# Patient Record
Sex: FEMALE | Race: WHITE | NOT HISPANIC OR LATINO | Employment: OTHER | URBAN - METROPOLITAN AREA
[De-identification: names, ages, dates, MRNs, and addresses within clinical notes are randomized per-mention and may not be internally consistent; named-entity substitution may affect disease eponyms.]

---

## 2017-02-06 ENCOUNTER — OFFICE VISIT (OUTPATIENT)
Dept: INTERNAL MEDICINE | Facility: CLINIC | Age: 82
End: 2017-02-06
Payer: COMMERCIAL

## 2017-02-06 VITALS
WEIGHT: 136.2 LBS | SYSTOLIC BLOOD PRESSURE: 120 MMHG | TEMPERATURE: 98.4 F | BODY MASS INDEX: 22.69 KG/M2 | DIASTOLIC BLOOD PRESSURE: 78 MMHG | HEIGHT: 65 IN | OXYGEN SATURATION: 94 % | HEART RATE: 78 BPM

## 2017-02-06 DIAGNOSIS — I10 ESSENTIAL HYPERTENSION: ICD-10-CM

## 2017-02-06 DIAGNOSIS — R73.01 IMPAIRED FASTING GLUCOSE: ICD-10-CM

## 2017-02-06 DIAGNOSIS — Z85.3 PERSONAL HISTORY OF MALIGNANT NEOPLASM OF BREAST: ICD-10-CM

## 2017-02-06 DIAGNOSIS — R30.0 DYSURIA: ICD-10-CM

## 2017-02-06 DIAGNOSIS — Z00.00 MEDICARE ANNUAL WELLNESS VISIT, SUBSEQUENT: Primary | ICD-10-CM

## 2017-02-06 DIAGNOSIS — N18.2 CKD (CHRONIC KIDNEY DISEASE) STAGE 2, GFR 60-89 ML/MIN: ICD-10-CM

## 2017-02-06 DIAGNOSIS — R41.3 MEMORY CHANGES: ICD-10-CM

## 2017-02-06 PROCEDURE — 99397 PER PM REEVAL EST PAT 65+ YR: CPT | Performed by: INTERNAL MEDICINE

## 2017-02-06 PROCEDURE — 99213 OFFICE O/P EST LOW 20 MIN: CPT | Mod: 25 | Performed by: INTERNAL MEDICINE

## 2017-02-06 NOTE — PROGRESS NOTES
SUBJECTIVE:                                                            Destiny Gomez is a 81 year old female who presents for Preventive Visit.    Are you in the first 12 months of your Medicare Part B coverage?  No    Healthy Habits:    Do you get at least three servings of calcium containing foods daily (dairy, green leafy vegetables, etc.)? yes    Amount of exercise or daily activities, outside of work: 1-2 day(s) per week    Problems taking medications regularly No    Medication side effects: No    Have you had an eye exam in the past two years? yes    Do you see a dentist twice per year? yes    Do you have sleep apnea, excessive snoring or daytime drowsiness?no    COGNITIVE SCREEN  1) Repeat 3 items (Banana, Sunrise, Chair)    2) Clock draw: ABNORMAL  3) 3 item recall: Recalls 1 object   Results: ABNORMAL clock, 1-2 items recalled: COGNITIVE IMPAIRMENT POSSIBLE    Mini-CogTM Copyright S Manuel. Licensed by the author for use in [facility] [employer]; reprinted with permission ([email or web address]). All rights reserved.      Additional issues to address:  1.  Follow-up HTN.  Patient is not checking outpatient blood pressures.  She reports no side effects from BP medications.   2.  Follow-up CKD2, impaired fasting glucose   3.  Patient has noted some problems with memory.   Still pays the bills, leads a bible study, looks up things.  Reads a lot, maybe a bit of problems recalling.  Lives with  in their home.   Staying very active.    still works, owns a business.     4.  Would like B12 check.  Patient is swallowing her B12, not using under the tongue.  5.  Mild intermittent dysuria, several months or more.   Cranberry tablets and azo do help, but symptoms come back.  Mild urinary frequency, nocturia x 2.  6.  Wants ELLE saldana treated.  No symptoms.  7.  Follow-up neuropathy, some symptoms when rising.  No other paresthesias, etc.      All Histories reviewed and updated in [facility] as appropriate.  Social  "History   Substance Use Topics     Smoking status: Never Smoker      Smokeless tobacco: Never Used      Comment: smoker from ages [age]     Alcohol Use: Yes      Comment: rarely       The patient does not drink >3 drinks per day nor >7 drinks per week.    Today's PHQ-2 Score:   PHQ-2 ( 1999 Pfizer) 12/30/2015 12/15/2014   Q1: Little interest or pleasure in doing things 0 0   Q2: Feeling down, depressed or hopeless 0 0   PHQ-2 Score 0 0       Do you feel safe in your environment - Yes    Do you have a Health Care Directive?: Yes: Advance Directive has been received and scanned.    Current providers sharing in care for this patient include:   Patient Care Team:  Neri Stubbs MD as PCP - General (Internal Medicine)      Hearing impairment: No    Ability to successfully perform activities of daily living: Yes, no assistance needed     Fall risk:  Fallen 2 or more times in the past year?: No  Any fall with injury in the past year?: No    Home safety:  lack of grab bars in the bathroom      The following health maintenance items are reviewed in Epic and correct as of today:  Health Maintenance   Topic Date Due     INFLUENZA VACCINE (SYSTEM ASSIGNED)  09/01/2016     BMP Q1 YR (NO INBASKET)  12/30/2016     FALL RISK ASSESSMENT  12/30/2016     MICROALBUMIN Q1 YEAR( NO INBASKET)  12/30/2016     TETANUS IMMUNIZATION (SYSTEM ASSIGNED)  05/19/2019     LIPID SCREEN Q5 YR FEMALE (SYSTEM ASSIGNED)  12/15/2019     ADVANCE DIRECTIVE PLANNING Q5 YRS (NO INBASKET)  01/19/2021     DEXA SCAN SCREENING (SYSTEM ASSIGNED)  Completed     PNEUMOCOCCAL  Completed         ROS:  Constitutional, HEENT, cardiovascular, pulmonary, gi and gu systems are negative, except as otherwise noted.      OBJECTIVE:                                                            /78 mmHg  Pulse 78  Temp(Src) 98.4  F (36.9  C) (Oral)  Ht 5' 4.5\" (1.638 m)  Wt 136 lb 3.2 oz (61.78 kg)  BMI 23.03 kg/m2  SpO2 94% Estimated body mass index is 24.56 " "kg/(m^2) as calculated from the following:    Height as of 12/30/15: 5' 5.5\" (1.664 m).    Weight as of 12/30/15: 149 lb 14.4 oz (67.994 kg).  EXAM:   GENERAL: healthy, alert and no distress  NECK: no adenopathy, no asymmetry, masses, or scars and thyroid normal to palpation  RESP: lungs clear to auscultation - no rales, rhonchi or wheezes  CV: regular rate and rhythm, normal S1 S2, no S3 or S4, no murmur, click or rub, no peripheral edema and peripheral pulses strong  MS: no gross musculoskeletal defects noted, no edema  NEURO: Normal strength and tone, speech normal, conversationally intact, gait is good    Large SK R lateral brow area, thick, frozen.    WORLD spell correct back and forth  2 errors on serial 7's to 50's    ASSESSMENT / PLAN:                                                                ASSESSMENT:   1.  Annual Wellness Visit  2.  HTN, controlled   3.  Follow-up CKD2  4.  Mild objective memory issues, overall seems to be doing well from a functional point of view.    Will need to follow.  5.  Intermittent dysuria, frequency    PLAN:  1.  Check fasting labs soon - do urine specimen at that time.  2.  Further plans after labs done.   If negative, would use the cranberry extract daily and recheck things in a year.    3.  DXA scan        End of Life Planning:  Patient currently has an advanced directive: Yes.  Practitioner is supportive of decision.    COUNSELING:  Reviewed preventive health counseling, as reflected in patient instructions        Estimated body mass index is 24.56 kg/(m^2) as calculated from the following:    Height as of 12/30/15: 5' 5.5\" (1.664 m).    Weight as of 12/30/15: 149 lb 14.4 oz (67.994 kg).     reports that she has never smoked. She has never used smokeless tobacco.      Appropriate preventive services were discussed with this patient, including applicable screening as appropriate for cardiovascular disease, diabetes, osteopenia/osteoporosis, and glaucoma.  As appropriate " for age/gender, discussed screening for colorectal cancer, prostate cancer, breast cancer, and cervical cancer. Checklist reviewing preventive services available has been given to the patient.    Reviewed patients plan of care and provided an AVS. The Basic Care Plan (routine screening as documented in Health Maintenance) for Destiny meets the Care Plan requirement. This Care Plan has been established and reviewed with the Patient.    Counseling Resources:  ATP IV Guidelines  Pooled Cohorts Equation Calculator  Breast Cancer Risk Calculator  FRAX Risk Assessment  ICSI Preventive Guidelines  Dietary Guidelines for Americans, 2010  USDA's MyPlate  ASA Prophylaxis  Lung CA Screening    Neri Stubbs MD  [facility]

## 2017-02-06 NOTE — NURSING NOTE
"Chief Complaint   Patient presents with     Wellness Visit       Initial /78 mmHg  Pulse 78  Temp(Src) 98.4  F (36.9  C) (Oral)  Ht 5' 4.5\" (1.638 m)  Wt 136 lb 3.2 oz (61.78 kg)  BMI 23.03 kg/m2  SpO2 94% Estimated body mass index is 23.03 kg/(m^2) as calculated from the following:    Height as of this encounter: 5' 4.5\" (1.638 m).    Weight as of this encounter: 136 lb 3.2 oz (61.78 kg).  Medication Reconciliation: complete    "

## 2017-02-06 NOTE — MR AVS SNAPSHOT
After Visit Summary   2/6/2017    Destiny Gomez    MRN: [id number]           Patient Information     Date Of Birth          1935        Visit Information        Provider Department      2/6/2017 3:30 PM Neri Stubbs MD [facility]        Today's Diagnoses     Essential hypertension    -  1     Dysuria         CKD (chronic kidney disease) stage 2, GFR 60-89 ml/min         Impaired fasting glucose         Personal history of malignant neoplasm of breast         Memory changes           Care Instructions      Preventive Health Recommendations    Female Ages 65 +    Yearly exam:     See your health care provider every year in order to  o Review health changes.   o Discuss preventive care.    o Review your medicines if your doctor has prescribed any.      You no longer need a yearly Pap test unless you've had an abnormal Pap test in the past 10 years. If you have vaginal symptoms, such as bleeding or discharge, be sure to talk with your provider about a Pap test.      Every 1 to 2 years, have a mammogram.  If you are over 69, talk with your health care provider about whether or not you want to continue having screening mammograms.      Every 10 years, have a colonoscopy. Or, have a yearly FIT test (stool test). These exams will check for colon cancer.       Have a cholesterol test every 5 years, or more often if your doctor advises it.       Have a diabetes test (fasting glucose) every three years. If you are at risk for diabetes, you should have this test more often.       At age 65, have a bone density scan (DEXA) to check for osteoporosis (brittle bone disease).    Shots:    Get a flu shot each year.    Get a tetanus shot every 10 years.    Talk to your doctor about your pneumonia vaccines. There are now two you should receive - Pneumovax (PPSV 23) and Prevnar (PCV 13).    Talk to your doctor about the shingles vaccine.    Talk to your doctor about the hepatitis B  vaccine.    Nutrition:     Eat at least 5 servings of fruits and vegetables each day.      Eat whole-grain bread, whole-wheat pasta and brown rice instead of white grains and rice.      Talk to your provider about Calcium and Vitamin D.     Lifestyle    Exercise at least 150 minutes a week (30 minutes a day, 5 days a week). This will help you control your weight and prevent disease.      Limit alcohol to one drink per day.      No smoking.       Wear sunscreen to prevent skin cancer.       See your dentist twice a year for an exam and cleaning.      See your eye doctor every 1 to 2 years to screen for conditions such as glaucoma, macular degeneration and cataracts.    PLAN:  1.  Check fasting labs soon - do urine specimen at that time.  2.  Further plans after labs done.   If negative, would use the cranberry extract daily and recheck things in a year.          Follow-ups after your visit        Future tests that were ordered for you today     Open Future Orders        Priority Expected Expires Ordered    Vitamin B12 Routine 2/6/2017 2/6/2018 2/6/2017    UA with Microscopic reflex to Culture Routine  2/6/2018 2/6/2017    Hemoglobin A1c Routine 2/6/2017 2/6/2018 2/6/2017    TSH Routine 2/6/2017 2/6/2018 2/6/2017            Who to contact     If you have questions or need follow up information about today's clinic visit or your schedule please contact [facility] directly at [phone].  Normal or non-critical lab and imaging results will be communicated to you by MyChart, letter or phone within 4 business days after the clinic has received the results. If you do not hear from us within 7 days, please contact the clinic through MyChart or phone. If you have a critical or abnormal lab result, we will notify you by phone as soon as possible.  Submit refill requests through [employer] or call your pharmacy and they will forward the refill request to us. Please allow 3 business days for your refill  "to be completed.          Additional Information About Your Visit        [employer] Information     iCAD lets you send messages to your doctor, view your test results, renew your prescriptions, schedule appointments and more. To sign up, go to [email or web address] . Click on \"Log in\" on the left side of the screen, which will take you to the Welcome page. Then click on \"Sign up Now\" on the right side of the page.     You will be asked to enter the access code listed below, as well as some personal information. Please follow the directions to create your username and password.     Your access code is: [email or web address]  Expires: 2017  4:46 PM     Your access code will  in 90 days. If you need help or a new code, please call your [facility] clinic or [phone].        Care EveryWhere ID     This is your [facility] EveryWhere ID. This could be used by other organizations to access your [facility] medical records  [id number]        Your Vitals Were     Pulse Temperature Height BMI (Body Mass Index) Pulse Oximetry       78 98.4  F (36.9  C) (Oral) 5' 4.5\" (1.638 m) 23.03 kg/m2 94%        Blood Pressure from Last 3 Encounters:   17 120/78   12/30/15 118/66   12/15/14 130/80    Weight from Last 3 Encounters:   17 136 lb 3.2 oz (61.78 kg)   12/30/15 149 lb 14.4 oz (67.994 kg)   12/15/14 163 lb 4.8 oz (74.072 kg)               Primary Care Provider Office Phone # Fax #    Neri Stubbs [phone]       [facility] [address] [facility] [address]        Thank you!     Thank you for choosing [facility]  for your care. Our goal is always to provide you with excellent care. Hearing back from our patients is one way we can continue to improve our services. Please take a few minutes to complete the written survey that you may receive in the mail after your visit with us. Thank you!             Your Updated Medication List - Protect others around you: " Learn how to safely use, store and throw away your medicines at www.disposemymeds.org.          This list is accurate as of: 2/6/17  4:46 PM.  Always use your most recent med list.                   Brand Name Dispense Instructions for use    CALCIUM 600 + D 600-200 MG-UNIT Tabs     100    2 PO QD       CRANBERRY EXTRACT PO      Take  by mouth daily.       cyabnocobalamin 2500 MCG sublingual tablet    VITAMIN B-12     Place 2,500 mcg under the tongue daily       losartan 100 MG tablet    COZAAR    90 tablet    Take 1 tablet (100 mg) by mouth daily

## 2017-02-06 NOTE — PATIENT INSTRUCTIONS

## 2017-02-07 ENCOUNTER — TELEPHONE (OUTPATIENT)
Dept: INTERNAL MEDICINE | Facility: CLINIC | Age: 82
End: 2017-02-07

## 2017-02-07 NOTE — TELEPHONE ENCOUNTER
Reason for Call: Request for an order or referral:    Order or referral being requested: dexa    Date needed: as soon as possible    Has the patient been seen by the PCP for this problem? Not Applicable    Additional comments: It was hand written on the after visit summary. No orders were placed. Call her if she needs to make an appointment.    Phone number Patient can be reached at:  Other phone number:  [phone]    Best Time:  anytime     Can we leave a detailed message on this number?  YES    Call taken on 2/7/2017 at 10:42 AM by Mignon Ferrell

## 2017-02-09 PROBLEM — R41.3 MEMORY LOSS: Status: ACTIVE | Noted: 2017-02-06

## 2017-02-15 DIAGNOSIS — R73.01 IMPAIRED FASTING GLUCOSE: ICD-10-CM

## 2017-02-15 DIAGNOSIS — I10 ESSENTIAL HYPERTENSION: ICD-10-CM

## 2017-02-15 DIAGNOSIS — N18.2 CKD (CHRONIC KIDNEY DISEASE) STAGE 2, GFR 60-89 ML/MIN: ICD-10-CM

## 2017-02-15 DIAGNOSIS — R30.0 DYSURIA: Primary | ICD-10-CM

## 2017-02-15 DIAGNOSIS — R41.3 MEMORY CHANGES: ICD-10-CM

## 2017-02-15 LAB
ALBUMIN UR-MCNC: NEGATIVE MG/DL
ANION GAP SERPL CALCULATED.3IONS-SCNC: 8 MMOL/L (ref 3–14)
APPEARANCE UR: CLEAR
BACTERIA #/AREA URNS HPF: ABNORMAL /HPF
BILIRUB UR QL STRIP: NEGATIVE
BUN SERPL-MCNC: 16 MG/DL (ref 7–30)
CALCIUM SERPL-MCNC: 9.6 MG/DL (ref 8.5–10.1)
CHLORIDE SERPL-SCNC: 110 MMOL/L (ref 94–109)
CO2 SERPL-SCNC: 24 MMOL/L (ref 20–32)
COLOR UR AUTO: YELLOW
CREAT SERPL-MCNC: 0.85 MG/DL (ref 0.52–1.04)
CREAT UR-MCNC: 76 MG/DL
GFR SERPL CREATININE-BSD FRML MDRD: 65 ML/MIN/1.7M2
GLUCOSE SERPL-MCNC: 111 MG/DL (ref 70–99)
GLUCOSE UR STRIP-MCNC: NEGATIVE MG/DL
HBA1C MFR BLD: 5.4 % (ref 4.3–6)
HGB UR QL STRIP: ABNORMAL
KETONES UR STRIP-MCNC: NEGATIVE MG/DL
LEUKOCYTE ESTERASE UR QL STRIP: ABNORMAL
MICROALBUMIN UR-MCNC: 115 MG/L
MICROALBUMIN/CREAT UR: 150.72 MG/G CR (ref 0–25)
NITRATE UR QL: POSITIVE
PH UR STRIP: 6 PH (ref 5–7)
POTASSIUM SERPL-SCNC: 3.8 MMOL/L (ref 3.4–5.3)
RBC #/AREA URNS AUTO: ABNORMAL /HPF (ref 0–2)
SODIUM SERPL-SCNC: 142 MMOL/L (ref 133–144)
SP GR UR STRIP: 1.01 (ref 1–1.03)
TSH SERPL DL<=0.05 MIU/L-ACNC: 2.36 MU/L (ref 0.4–4)
URN SPEC COLLECT METH UR: ABNORMAL
UROBILINOGEN UR STRIP-ACNC: 0.2 EU/DL (ref 0.2–1)
VIT B12 SERPL-MCNC: 1165 PG/ML (ref 193–986)
WBC #/AREA URNS AUTO: ABNORMAL /HPF (ref 0–2)

## 2017-02-15 PROCEDURE — 80048 BASIC METABOLIC PNL TOTAL CA: CPT | Performed by: INTERNAL MEDICINE

## 2017-02-15 PROCEDURE — 36415 COLL VENOUS BLD VENIPUNCTURE: CPT | Performed by: INTERNAL MEDICINE

## 2017-02-15 PROCEDURE — 84443 ASSAY THYROID STIM HORMONE: CPT | Performed by: INTERNAL MEDICINE

## 2017-02-15 PROCEDURE — 82607 VITAMIN B-12: CPT | Performed by: INTERNAL MEDICINE

## 2017-02-15 PROCEDURE — 83036 HEMOGLOBIN GLYCOSYLATED A1C: CPT | Performed by: INTERNAL MEDICINE

## 2017-02-15 PROCEDURE — 81001 URINALYSIS AUTO W/SCOPE: CPT | Performed by: INTERNAL MEDICINE

## 2017-02-15 PROCEDURE — 87186 SC STD MICRODIL/AGAR DIL: CPT | Performed by: INTERNAL MEDICINE

## 2017-02-15 PROCEDURE — 82043 UR ALBUMIN QUANTITATIVE: CPT | Performed by: INTERNAL MEDICINE

## 2017-02-15 PROCEDURE — 87086 URINE CULTURE/COLONY COUNT: CPT | Performed by: INTERNAL MEDICINE

## 2017-02-15 PROCEDURE — 87088 URINE BACTERIA CULTURE: CPT | Performed by: INTERNAL MEDICINE

## 2017-02-19 LAB
BACTERIA SPEC CULT: ABNORMAL
MICRO REPORT STATUS: ABNORMAL
MICROORGANISM SPEC CULT: ABNORMAL
MICROORGANISM SPEC CULT: ABNORMAL
SPECIMEN SOURCE: ABNORMAL

## 2017-02-20 ENCOUNTER — TELEPHONE (OUTPATIENT)
Dept: INTERNAL MEDICINE | Facility: CLINIC | Age: 82
End: 2017-02-20

## 2017-02-20 DIAGNOSIS — N30.00 ACUTE CYSTITIS WITHOUT HEMATURIA: Primary | ICD-10-CM

## 2017-02-20 RX ORDER — SULFAMETHOXAZOLE/TRIMETHOPRIM 800-160 MG
1 TABLET ORAL 2 TIMES DAILY
Qty: 14 TABLET | Refills: 0 | Status: SHIPPED | OUTPATIENT
Start: 2017-02-20 | End: 2017-02-27

## 2017-02-21 NOTE — TELEPHONE ENCOUNTER
Urine is abnormal and growing out Klebsiella.  Please contact patient ASAP and advise.  Prescription for Septra sent.   Notify MD if symptoms not resolved.

## 2017-02-23 DIAGNOSIS — I10 ESSENTIAL HYPERTENSION: ICD-10-CM

## 2017-02-24 RX ORDER — LOSARTAN POTASSIUM 100 MG/1
100 TABLET ORAL DAILY
Qty: 90 TABLET | Refills: 3 | Status: SHIPPED | OUTPATIENT
Start: 2017-02-24 | End: 2018-01-29

## 2017-02-24 NOTE — TELEPHONE ENCOUNTER
losartan (COZAAR) 100 MG tablet      Last Written Prescription Date: 11/10/2016  Last Fill Quantity: 90 tablet, # refills: 0  Last Office Visit with G, P or [facility] prescribing provider: 2/06/2017       Potassium   Date Value Ref Range Status   02/15/2017 3.8 3.4 - 5.3 mmol/L Final     Creatinine   Date Value Ref Range Status   02/15/2017 0.85 0.52 - 1.04 mg/dL Final     BP Readings from Last 3 Encounters:   02/06/17 120/78   12/30/15 118/66   12/15/14 130/80

## 2018-01-29 DIAGNOSIS — I10 ESSENTIAL HYPERTENSION: ICD-10-CM

## 2018-01-30 RX ORDER — LOSARTAN POTASSIUM 100 MG/1
100 TABLET ORAL DAILY
Qty: 90 TABLET | Refills: 0 | Status: SHIPPED | OUTPATIENT
Start: 2018-01-30 | End: 2018-03-16

## 2018-03-16 ENCOUNTER — OFFICE VISIT (OUTPATIENT)
Dept: INTERNAL MEDICINE | Facility: CLINIC | Age: 83
End: 2018-03-16
Payer: MEDICARE

## 2018-03-16 VITALS
TEMPERATURE: 98.1 F | DIASTOLIC BLOOD PRESSURE: 72 MMHG | OXYGEN SATURATION: 99 % | HEART RATE: 82 BPM | BODY MASS INDEX: 22.9 KG/M2 | RESPIRATION RATE: 16 BRPM | SYSTOLIC BLOOD PRESSURE: 112 MMHG | HEIGHT: 66 IN | WEIGHT: 142.5 LBS

## 2018-03-16 DIAGNOSIS — Z00.00 MEDICARE ANNUAL WELLNESS VISIT, SUBSEQUENT: Primary | ICD-10-CM

## 2018-03-16 DIAGNOSIS — I10 ESSENTIAL HYPERTENSION: ICD-10-CM

## 2018-03-16 DIAGNOSIS — Z13.6 CARDIOVASCULAR SCREENING; LDL GOAL LESS THAN 130: ICD-10-CM

## 2018-03-16 DIAGNOSIS — Z91.81 AT RISK FOR FALLING: ICD-10-CM

## 2018-03-16 DIAGNOSIS — R41.3 MEMORY LOSS: ICD-10-CM

## 2018-03-16 DIAGNOSIS — N39.0 URINARY TRACT INFECTION WITHOUT HEMATURIA, SITE UNSPECIFIED: ICD-10-CM

## 2018-03-16 DIAGNOSIS — R73.01 IMPAIRED FASTING GLUCOSE: ICD-10-CM

## 2018-03-16 DIAGNOSIS — N18.2 CKD (CHRONIC KIDNEY DISEASE) STAGE 2, GFR 60-89 ML/MIN: ICD-10-CM

## 2018-03-16 LAB
ANION GAP SERPL CALCULATED.3IONS-SCNC: 3 MMOL/L (ref 3–14)
BUN SERPL-MCNC: 23 MG/DL (ref 7–30)
CALCIUM SERPL-MCNC: 10 MG/DL (ref 8.5–10.1)
CHLORIDE SERPL-SCNC: 108 MMOL/L (ref 94–109)
CHOLEST SERPL-MCNC: 214 MG/DL
CO2 SERPL-SCNC: 30 MMOL/L (ref 20–32)
CREAT SERPL-MCNC: 0.93 MG/DL (ref 0.52–1.04)
CREAT UR-MCNC: 114 MG/DL
GFR SERPL CREATININE-BSD FRML MDRD: 58 ML/MIN/1.7M2
GLUCOSE SERPL-MCNC: 115 MG/DL (ref 70–99)
HDLC SERPL-MCNC: 59 MG/DL
HGB BLD-MCNC: 15.1 G/DL (ref 11.7–15.7)
LDLC SERPL CALC-MCNC: 139 MG/DL
MICROALBUMIN UR-MCNC: 148 MG/L
MICROALBUMIN/CREAT UR: 129.82 MG/G CR (ref 0–25)
NONHDLC SERPL-MCNC: 155 MG/DL
POTASSIUM SERPL-SCNC: 4.4 MMOL/L (ref 3.4–5.3)
SODIUM SERPL-SCNC: 141 MMOL/L (ref 133–144)
TRIGL SERPL-MCNC: 78 MG/DL

## 2018-03-16 PROCEDURE — 99214 OFFICE O/P EST MOD 30 MIN: CPT | Mod: 25 | Performed by: INTERNAL MEDICINE

## 2018-03-16 PROCEDURE — 85018 HEMOGLOBIN: CPT | Performed by: INTERNAL MEDICINE

## 2018-03-16 PROCEDURE — 80061 LIPID PANEL: CPT | Performed by: INTERNAL MEDICINE

## 2018-03-16 PROCEDURE — 80048 BASIC METABOLIC PNL TOTAL CA: CPT | Performed by: INTERNAL MEDICINE

## 2018-03-16 PROCEDURE — 36415 COLL VENOUS BLD VENIPUNCTURE: CPT | Performed by: INTERNAL MEDICINE

## 2018-03-16 PROCEDURE — 99397 PER PM REEVAL EST PAT 65+ YR: CPT | Performed by: INTERNAL MEDICINE

## 2018-03-16 PROCEDURE — 82043 UR ALBUMIN QUANTITATIVE: CPT | Performed by: INTERNAL MEDICINE

## 2018-03-16 RX ORDER — MULTIPLE VITAMINS W/ MINERALS TAB 9MG-400MCG
1 TAB ORAL DAILY
COMMUNITY
End: 2019-04-26

## 2018-03-16 RX ORDER — LOSARTAN POTASSIUM 100 MG/1
100 TABLET ORAL DAILY
Qty: 90 TABLET | Refills: 0 | Status: SHIPPED | OUTPATIENT
Start: 2018-03-16 | End: 2018-07-18

## 2018-03-16 NOTE — PROGRESS NOTES
SUBJECTIVE:   Destiny Gomez is a 82 year old female who presents for Preventive Visit.   Here with daughter, Caryn.    Are you in the first 12 months of your Medicare Part B coverage?  No    Healthy Habits:    Do you get at least three servings of calcium containing foods daily (dairy, green leafy vegetables, etc.)? yes    Amount of exercise or daily activities, outside of work: none    Problems taking medications regularly No    Medication side effects: diminished taste?     Have you had an eye exam in the past two years? yes    Do you see a dentist twice per year? yes    Do you have sleep apnea, excessive snoring or daytime drowsiness?no      Ability to successfully perform activities of daily living: Yes, no assistance needed    Home safety:  none identified     Hearing impairment: No    Fall risk:  Fallen 2 or more times in the past year?: No  Any fall with injury in the past year?: No      COGNITIVE SCREEN  1) Repeat 3 items (Banana, Sunrise, Chair)    2) Clock draw: NORMAL  3) 3 item recall: Recalls NO objects   Results: 0 items recalled: PROBABLE COGNITIVE IMPAIRMENT    Mini-CogTM Copyright MEGAN Jackson. Licensed by the author for use in [facility] [employer]; reprinted with permission ([email or web address]). All rights reserved.      Additional issues to address:  1.  Follow-up memory loss, short term memory.  Still drives, hasn't been lost.   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    Still pays the bills, goes to [employer] study twice weekly, looks up things.  Reads a lot, mostly newspaper - not books any more.  Lives with  in their home.   Staying very active.    still works, owns a business.     - patient uses a calendar, still able to recall some appointments.   Double and triple checks many things with family.   - daughter notes that she is less bold about her sphere than in the past  2.  Follow-up HTN.  Patient is not checking outpatient blood pressures.  She  reports no side effects from BP medications.   Denies lightheadedness.    3.  Follow-up CKD2, impaired fasting glucose   4.  Follow-up UTI last office visit a year ago.   Patient denies dysuria, frequency.      Reviewed and updated as needed this visit by clinical staff  Tobacco  Allergies  Meds         Reviewed and updated as needed this visit by Provider        Social History   Substance Use Topics     Smoking status: Never Smoker     Smokeless tobacco: Never Used      Comment: smoker from ages [age]     Alcohol use Yes      Comment: rarely       If you drink alcohol do you typically have >3 drinks per day or >7 drinks per week? No                        Today's PHQ-2 Score:   PHQ-2 ( 1999 Pfizer) 2/6/2017 12/30/2015   Q1: Little interest or pleasure in doing things 0 0   Q2: Feeling down, depressed or hopeless 0 0   PHQ-2 Score 0 0       Do you feel safe in your environment - Yes    Do you have a Health Care Directive?: Yes: Advance Directive has been received and scanned.    Current providers sharing in care for this patient include:   Patient Care Team:  Neri Stubbs MD as PCP - General (Internal Medicine)    The following health maintenance items are reviewed in Epic and correct as of today:  Health Maintenance   Topic Date Due     FALL RISK ASSESSMENT  02/06/2018     BMP Q1 YR  02/15/2018     MICROALBUMIN Q1 YEAR  02/15/2018     INFLUENZA VACCINE (SYSTEM ASSIGNED)  09/01/2018     TETANUS IMMUNIZATION (SYSTEM ASSIGNED)  05/19/2019     ADVANCE DIRECTIVE PLANNING Q5 YRS  01/19/2021     DEXA SCAN SCREENING (SYSTEM ASSIGNED)  Completed     PNEUMOCOCCAL  Completed       ROS:  CONSTITUTIONAL: NEGATIVE for fever, chills, change in weight  INTEGUMENTARY/SKIN: NEGATIVE for worrisome rashes, moles or lesions  EYES: NEGATIVE for vision changes or irritation  ENT/MOUTH: gets sinus pressure each winter, usually better by spring.   Warm washcloth helps.   Otherwise NEGATIVE for ear, mouth and throat problems.  Some  "secondary headaches.  RESP: NEGATIVE for significant cough or SOB  BREAST: NEGATIVE for masses, tenderness or discharge  CV: NEGATIVE for chest pain, palpitations or peripheral edema  GI: NEGATIVE for nausea, abdominal pain, heartburn, or change in bowel habits  : NEGATIVE for frequency, dysuria, or hematuria  MUSCULOSKELETAL: NEGATIVE for significant arthralgias or myalgia  NEURO: NEGATIVE for weakness, dizziness or paresthesias  ENDOCRINE: NEGATIVE for temperature intolerance, skin/hair changes  HEME: NEGATIVE for bleeding problems  PSYCHIATRIC: NEGATIVE for changes in mood or affect    OBJECTIVE:   /72  Pulse 82  Temp 98.1  F (36.7  C) (Oral)  Resp 16  Ht 5' 5.75\" (1.67 m)  Wt 142 lb 8 oz (64.6 kg)  SpO2 99%  BMI 23.18 kg/m2 Estimated body mass index is 23.02 kg/(m^2) as calculated from the following:    Height as of 2/6/17: 5' 4.5\" (1.638 m).    Weight as of 2/6/17: 136 lb 3.2 oz (61.8 kg).  EXAM:   Reg heart tones  No carotid bruits]  Speech and conversation seems fully normal  Walks a bit bent over - looks at the ground, but straightens up   Normal Romberg, gait fairly normal  WORLD spell incorrect, backward and forward.  Cannot recall recent events, at all    ASSESSMENT / PLAN:     ASSESSMENT:   1.  Annual Wellness Visit  2.  Short term memory loss/dementia.  Rule out vascular, alzheimer's type.   No previous brain imaging, will proceed.  Discussed in full.    3.  HTN, controlled   4.  Recheck after UTI      PLAN:  1.  Continue present medications   2.  Consider trying nasal steroid for sinus pressure:   flonase 2 sprays in each nostril daily, as needed (over the counter)  3.  Check labs today --> lipids up, glucose 115, urine protein high without change -->  Will plan to start stain if vascular changes on MRI     4.  Brain MRI    End of Life Planning:  Patient currently has an advanced directive: Yes.  Practitioner is supportive of decision.    COUNSELING:  Reviewed preventive health " "counseling, as reflected in patient instructions       Regular exercise      Estimated body mass index is 23.02 kg/(m^2) as calculated from the following:    Height as of 2/6/17: 5' 4.5\" (1.638 m).    Weight as of 2/6/17: 136 lb 3.2 oz (61.8 kg).       reports that she has never smoked. She has never used smokeless tobacco.      Appropriate preventive services were discussed with this patient, including applicable screening as appropriate for cardiovascular disease, diabetes, osteopenia/osteoporosis, and glaucoma.  As appropriate for age/gender, discussed screening for colorectal cancer, prostate cancer, breast cancer, and cervical cancer. Checklist reviewing preventive services available has been given to the patient.    Reviewed patients plan of care and provided an AVS. The Basic Care Plan (routine screening as documented in Health Maintenance) for Destiny meets the Care Plan requirement. This Care Plan has been established and reviewed with the Patient and daughter.    Counseling Resources:  ATP IV Guidelines  Pooled Cohorts Equation Calculator  Breast Cancer Risk Calculator  FRAX Risk Assessment  ICSI Preventive Guidelines  Dietary Guidelines for Americans, 2010  USDA's MyPlate  ASA Prophylaxis  Lung CA Screening    Neri Stubbs MD  [facility]  "

## 2018-03-16 NOTE — MR AVS SNAPSHOT
After Visit Summary   3/16/2018    Destiny Gomez    MRN: [id number]           Patient Information     Date Of Birth          1935        Visit Information        Provider Department      3/16/2018 8:30 AM Neri Stubbs MD [facility]        Today's Diagnoses     Medicare annual wellness visit, subsequent    -  1    Essential hypertension        CKD (chronic kidney disease) stage 2, GFR 60-89 ml/min        Memory loss        CARDIOVASCULAR SCREENING; LDL GOAL LESS THAN 130        Impaired fasting glucose        At risk for falling        F/U Urinary tract infection          Care Instructions      Preventive Health Recommendations    Female Ages 65 +    Yearly exam:     See your health care provider every year in order to  o Review health changes.   o Discuss preventive care.    o Review your medicines if your doctor has prescribed any.      You no longer need a yearly Pap test unless you've had an abnormal Pap test in the past 10 years. If you have vaginal symptoms, such as bleeding or discharge, be sure to talk with your provider about a Pap test.      Every 1 to 2 years, have a mammogram.  If you are over 69, talk with your health care provider about whether or not you want to continue having screening mammograms.      Every 10 years, have a colonoscopy. Or, have a yearly FIT test (stool test). These exams will check for colon cancer.       Have a cholesterol test every 5 years, or more often if your doctor advises it.       Have a diabetes test (fasting glucose) every three years. If you are at risk for diabetes, you should have this test more often.       At age 65, have a bone density scan (DEXA) to check for osteoporosis (brittle bone disease).    Shots:    Get a flu shot each year.    Get a tetanus shot every 10 years.    Talk to your doctor about your pneumonia vaccines. There are now two you should receive - Pneumovax (PPSV 23) and Prevnar (PCV 13).    Talk to  your doctor about the shingles vaccine.    Talk to your doctor about the hepatitis B vaccine.    Nutrition:     Eat at least 5 servings of fruits and vegetables each day.      Eat whole-grain bread, whole-wheat pasta and brown rice instead of white grains and rice.      Talk to your provider about Calcium and Vitamin D.     Lifestyle    Exercise at least 150 minutes a week (30 minutes a day, 5 days a week). This will help you control your weight and prevent disease.      Limit alcohol to one drink per day.      No smoking.       Wear sunscreen to prevent skin cancer.       See your dentist twice a year for an exam and cleaning.      See your eye doctor every 1 to 2 years to screen for conditions such as glaucoma, macular degeneration and cataracts.    PLAN:  1.  Continue present medications   2.  Consider trying nasal steroid for sinus pressure:   flonase 2 sprays in each nostril daily, as needed (over the counter)  3.  Check labs today     4.  Brain MRI          Follow-ups after your visit        Future tests that were ordered for you today     Open Future Orders        Priority Expected Expires Ordered    UA with Microscopic reflex to Culture Routine 3/16/2018 3/16/2019 3/16/2018    MR Brain w/o & w Contrast Routine  3/16/2019 3/16/2018            Who to contact     If you have questions or need follow up information about today's clinic visit or your schedule please contact [facility] directly at [phone].  Normal or non-critical lab and imaging results will be communicated to you by MyChart, letter or phone within 4 business days after the clinic has received the results. If you do not hear from us within 7 days, please contact the clinic through MyChart or phone. If you have a critical or abnormal lab result, we will notify you by phone as soon as possible.  Submit refill requests through [employer] or call your pharmacy and they will forward the refill request to us. Please allow 3  "business days for your refill to be completed.          Additional Information About Your Visit        MyChart Information     [employer]page lets you send messages to your doctor, view your test results, renew your prescriptions, schedule appointments and more. To sign up, go to [email or web address] . Click on \"Log in\" on the left side of the screen, which will take you to the Welcome page. Then click on \"Sign up Now\" on the right side of the page.     You will be asked to enter the access code listed below, as well as some personal information. Please follow the directions to create your username and password.     Your access code is: [email or web address]  Expires: 2018  9:35 AM     Your access code will  in 90 days. If you need help or a new code, please call your [facility] clinic or [phone].        Care EveryWhere ID     This is your Care EveryWhere ID. This could be used by other organizations to access your [facility] medical records  [id number]        Your Vitals Were     Pulse Temperature Respirations Height Pulse Oximetry BMI (Body Mass Index)    82 98.1  F (36.7  C) (Oral) 16 5' 5.75\" (1.67 m) 99% 23.18 kg/m2       Blood Pressure from Last 3 Encounters:   18 112/72   17 120/78   12/30/15 118/66    Weight from Last 3 Encounters:   18 142 lb 8 oz (64.6 kg)   17 136 lb 3.2 oz (61.8 kg)   12/30/15 149 lb 14.4 oz (68 kg)              We Performed the Following     Albumin Random Urine Quantitative with Creat Ratio     BASIC METABOLIC PANEL     Hemoglobin     Lipid panel reflex to direct LDL Fasting          Where to get your medicines      These medications were [id number] - [place], [place] - [address] [facility]  [address] [place] [address]     Phone:  [phone]     losartan 100 MG tablet          Primary Care Provider Office Phone # Fax #    Neri Stubbs [phone]       [address] [facility] [address]        Equal " Access to Services     : Hadii kirt camacho swapna Salazar, waaxda luqadaha, qaybta kaalmaelba hahn. So [facility] [phone].    ATENCIÓN: Si habla español, tiene a edward disposición servicios gratuitos de asistencia lingüística. Llame al [phone].    We comply with applicable federal civil rights laws and [place] laws. We do not discriminate on the basis of race, color, national origin, age, disability, sex, sexual orientation, or gender identity.            Thank you!     Thank you for choosing [facility]  for your care. Our goal is always to provide you with excellent care. Hearing back from our patients is one way we can continue to improve our services. Please take a few minutes to complete the written survey that you may receive in the mail after your visit with us. Thank you!             Your Updated Medication List - Protect others around you: Learn how to safely use, store and throw away your medicines at www.disposemymeds.org.          This list is accurate as of 3/16/18  9:35 AM.  Always use your most recent med list.                   Brand Name Dispense Instructions for use Diagnosis    CALCIUM 600 + D 600-200 MG-UNIT Tabs     100    2 PO QD        CRANBERRY EXTRACT PO      Take  by mouth daily.    Personal history of malignant neoplasm of breast, Impaired fasting glucose, Seborrheic keratoses, CARDIOVASCULAR SCREENING; LDL GOAL LESS THAN 130, Hypertension goal BP (blood pressure) < 140/90, Need for prophylactic vaccination and inoculation against influenza       cyanocobalamin 2500 MCG sublingual tablet    VITAMIN B-12     Place 2,500 mcg under the tongue daily    Peripheral neuropathy       losartan 100 MG tablet    COZAAR    90 tablet    Take 1 tablet (100 mg) by mouth daily Please schedule annual check-up with Dr. Stubbs.    Essential hypertension       Multi-vitamin Tabs tablet      Take 1 tablet by mouth daily

## 2018-03-16 NOTE — LETTER
March 21, 2018      Destiny Gomze  [address] [facility] [address]        Dear Destiny,    We are writing to inform you of your test results.   Please share this letter with Caryn.      Resulted Orders   BASIC METABOLIC PANEL   Result Value Ref Range    Sodium 141 133 - 144 mmol/L    Potassium 4.4 3.4 - 5.3 mmol/L    Chloride 108 94 - 109 mmol/L    Carbon Dioxide 30 20 - 32 mmol/L    Anion Gap 3 3 - 14 mmol/L    Glucose 115 (H) 70 - 99 mg/dL      Comment:      Fasting specimen    Urea Nitrogen 23 7 - 30 mg/dL    Creatinine 0.93 0.52 - 1.04 mg/dL    GFR Estimate 58 (L) >60 mL/min/1.7m2      Comment:      Non  GFR Calc    GFR Estimate If Black 70 >60 mL/min/1.7m2      Comment:       GFR Calc    Calcium 10.0 8.5 - 10.1 mg/dL   Albumin Random Urine Quantitative with Creat Ratio   Result Value Ref Range    Creatinine Urine 114 mg/dL    Albumin Urine mg/L 148 mg/L    Albumin Urine mg/g Cr 129.82 (H) 0 - 25 mg/g Cr   Lipid panel reflex to direct LDL Fasting   Result Value Ref Range    Cholesterol 214 (H) <200 mg/dL      Comment:      Desirable:       <200 mg/dl    Triglycerides 78 <150 mg/dL      Comment:      Fasting specimen    HDL Cholesterol 59 >49 mg/dL    LDL Cholesterol Calculated 139 (H) <100 mg/dL      Comment:      Above desirable:  100-129 mg/dl  Borderline High:  130-159 mg/dL  High:             160-189 mg/dL  Very high:       >189 mg/dl      Non HDL Cholesterol 155 (H) <130 mg/dL      Comment:      Above Desirable:  130-159 mg/dl  Borderline high:  160-189 mg/dl  High:             190-219 mg/dl  Very high:       >219 mg/dl     Hemoglobin   Result Value Ref Range    Hemoglobin 15.1 11.7 - 15.7 g/dL       Your glucose and urine protein remain high, without much change.   At this time, there is no diabetes.  Your lipids/cholesterol are up, compared to our last check in 2014.   Other labs all look good.    We need to see what your brain MRI shows.   If there are any  signs of vascular changes, I will suggest that you start a statin medication to lower your cholesterol.    If you have any questions or concerns, please call the clinic at the number listed above.       Sincerely,    Neri Stubbs MD

## 2018-03-16 NOTE — PATIENT INSTRUCTIONS

## 2018-04-10 ENCOUNTER — HOSPITAL ENCOUNTER (OUTPATIENT)
Dept: MRI IMAGING | Facility: CLINIC | Age: 83
Discharge: HOME | End: 2018-04-10
Attending: INTERNAL MEDICINE | Admitting: INTERNAL MEDICINE
Payer: MEDICARE

## 2018-04-10 DIAGNOSIS — R41.3 MEMORY LOSS: ICD-10-CM

## 2018-04-10 PROCEDURE — 70553 MRI BRAIN STEM W/O & W/DYE: CPT

## 2018-04-10 PROCEDURE — A9585 GADOBUTROL INJECTION: HCPCS | Performed by: INTERNAL MEDICINE

## 2018-04-10 PROCEDURE — 25000128 H RX IP 250 OP 636: Performed by: INTERNAL MEDICINE

## 2018-04-10 RX ORDER — GADOBUTROL 604.72 MG/ML
6 INJECTION INTRAVENOUS ONCE
Status: COMPLETED | OUTPATIENT
Start: 2018-04-10 | End: 2018-04-10

## 2018-04-10 RX ADMIN — GADOBUTROL 6 ML: 604.72 INJECTION INTRAVENOUS at 10:37

## 2018-04-10 NOTE — LETTER
"April 19, 2018      Destiny Gomez  [address] [facility] [address]        Dear ,    We are writing to inform you of the brain MRI results.   I have tried to reach Caryn without success, as her number is disconnected.      Resulted Orders   MR Brain w/o & w Contrast    Narrative    MRI BRAIN WITHOUT AND WITH CONTRAST April 10, 2018 10:37 AM    HISTORY: Memory loss.     TECHNIQUE: Multiplanar, multisequence MRI of the brain without and  with 6 mL Gadavist.    COMPARISON: None.    FINDINGS: There is no evidence of hemorrhage, mass, acute infarct, or  anomaly. Moderate diffuse parenchymal volume loss. Moderate patchy  deep and subcortical white matter T2 hyperintensities which are  nonspecific, but likely related to chronic microvascular ischemic  disease. Ventricular size within normal limits without hydrocephalus.     There is no abnormal intracranial enhancement.     The facial structures appear normal. The arteries at the base of the  brain and the dural venous sinuses appear patent.      Impression    IMPRESSION:    1. No evidence of acute infarct, mass, hemorrhage, or herniation.  2. Moderate diffuse parenchymal volume loss and white matter changes  likely due to chronic microvascular ischemic disease.     DOUGLAS SAMUEL MD             The MRI shows changes very consistent with most causes of dementia/memory loss.  There is brain shrinkage - common for someone at age 82.   There are also \"white matter changes\" which most likely represent many previous tiny little strokes.      If you have any questions or concerns, please call the clinic at the number listed above.       Sincerely,          Neri Stubbs M.D.                 "

## 2018-04-20 NOTE — ADDENDUM NOTE
Encounter addended by: Neri Stubbs MD on: 4/19/2018  8:38 PM<BR>     Actions taken: Letter status changed

## 2018-07-18 DIAGNOSIS — I10 ESSENTIAL HYPERTENSION: ICD-10-CM

## 2018-07-18 RX ORDER — LOSARTAN POTASSIUM 100 MG/1
100 TABLET ORAL DAILY
Qty: 90 TABLET | Refills: 2 | Status: SHIPPED | OUTPATIENT
Start: 2018-07-18 | End: 2019-04-12

## 2018-07-18 NOTE — TELEPHONE ENCOUNTER
"Requested Prescriptions   Pending Prescriptions Disp Refills     losartan (COZAAR) 100 MG tablet [Pharmacy Med Name: LOSARTAN POTASSIUM 100 MG TAB] 90 tablet 0     Sig: TAKE 1 TABLET (100 MG) BY MOUTH DAILY PLEASE SCHEDULE ANNUAL CHECK-UP WITH DR. STRONG.    Angiotensin-II Receptors Passed    7/18/2018  2:04 AM       Passed - Blood pressure under 140/90 in past 12 months    BP Readings from Last 3 Encounters:   03/16/18 112/72   02/06/17 120/78   12/30/15 118/66                Passed - Recent (12 mo) or future (30 days) visit within the authorizing provider's specialty    Patient had office visit in the last 12 months or has a visit in the next 30 days with authorizing provider or within the authorizing provider's specialty.  See \"Patient Info\" tab in inbasket, or \"Choose Columns\" in Meds & Orders section of the refill encounter.           Passed - Patient is age 18 or older       Passed - No active pregnancy on record       Passed - Normal serum creatinine on file in past 12 months    Recent Labs   Lab Test  03/16/18   0955   CR  0.93            Passed - Normal serum potassium on file in past 12 months    Recent Labs   Lab Test  03/16/18   0955   POTASSIUM  4.4                   Passed - No positive pregnancy test in past 12 months        Last Written Prescription Date:  3/16/18  Last Fill Quantity: 90,  # refills: 0   Last office visit: 3/16/2018 with prescribing provider:  3/16/18   Future Office Visit:      "

## 2019-04-12 DIAGNOSIS — I10 ESSENTIAL HYPERTENSION: ICD-10-CM

## 2019-04-12 RX ORDER — LOSARTAN POTASSIUM 100 MG/1
TABLET ORAL
Qty: 30 TABLET | Refills: 0 | Status: SHIPPED | OUTPATIENT
Start: 2019-04-12 | End: 2019-05-09

## 2019-04-12 NOTE — LETTER
[facility]  [address] [id number] [phone]            Destiny Gomez  [address] [facility] [address]        April 12, 2019    Dear Destiny,    While refilling your prescription today, we noticed that you are due for an appointment with your provider.  We will refill your prescription for 30 days, but a follow-up appointment must be made before any additional refills can be approved.     Taking care of your health is important to us and we look forward to seeing you in the near future.  Please call us at [phone] or [phone] (or use [phone]) to schedule an appointment.     Please disregard this notice if you have already made an appointment.    Sincerely,        [facility]

## 2019-04-12 NOTE — TELEPHONE ENCOUNTER
"Last Written Prescription Date:  7/18/2018  Last Fill Quantity: 90,  # refills: 2   Last office visit: 3/16/2018 with prescribing provider:  Neri Stubbs   Future Office Visit:    Requested Prescriptions   Pending Prescriptions Disp Refills     losartan (COZAAR) 100 MG tablet [Pharmacy Med Name: LOSARTAN POTASSIUM 100 MG TAB] 90 tablet 2     Sig: TAKE 1 TABLET BY MOUTH EVERY DAY       Angiotensin-II Receptors Failed - 4/12/2019  1:25 AM        Failed - Blood pressure under 140/90 in past 12 months     BP Readings from Last 3 Encounters:   03/16/18 112/72   02/06/17 120/78   12/30/15 118/66                 Failed - Recent (12 mo) or future (30 days) visit within the authorizing provider's specialty     Patient had office visit in the last 12 months or has a visit in the next 30 days with authorizing provider or within the authorizing provider's specialty.  See \"Patient Info\" tab in inbasket, or \"Choose Columns\" in Meds & Orders section of the refill encounter.              Failed - Normal serum creatinine on file in past 12 months     Recent Labs   Lab Test 03/16/18  0955   CR 0.93             Failed - Normal serum potassium on file in past 12 months     Recent Labs   Lab Test 03/16/18  0955   POTASSIUM 4.4                    Passed - Medication is active on med list        Passed - Patient is age 18 or older        Passed - No active pregnancy on record        Passed - No positive pregnancy test in past 12 months          "

## 2019-04-26 ENCOUNTER — OFFICE VISIT (OUTPATIENT)
Dept: INTERNAL MEDICINE | Facility: CLINIC | Age: 84
End: 2019-04-26
Payer: MEDICARE

## 2019-04-26 VITALS
BODY MASS INDEX: 21.86 KG/M2 | SYSTOLIC BLOOD PRESSURE: 118 MMHG | HEIGHT: 66 IN | TEMPERATURE: 98.2 F | DIASTOLIC BLOOD PRESSURE: 70 MMHG | RESPIRATION RATE: 18 BRPM | WEIGHT: 136 LBS | HEART RATE: 64 BPM | OXYGEN SATURATION: 97 %

## 2019-04-26 DIAGNOSIS — E55.9 VITAMIN D DEFICIENCY: ICD-10-CM

## 2019-04-26 DIAGNOSIS — Z00.00 MEDICARE ANNUAL WELLNESS VISIT, SUBSEQUENT: Primary | ICD-10-CM

## 2019-04-26 DIAGNOSIS — I10 BENIGN ESSENTIAL HYPERTENSION: ICD-10-CM

## 2019-04-26 DIAGNOSIS — R73.01 ELEVATED FASTING GLUCOSE: ICD-10-CM

## 2019-04-26 PROBLEM — R41.3 MEMORY LOSS: Status: RESOLVED | Noted: 2017-02-06 | Resolved: 2019-04-26

## 2019-04-26 LAB
ALBUMIN SERPL-MCNC: 3.7 G/DL (ref 3.4–5)
ALP SERPL-CCNC: 54 U/L (ref 40–150)
ALT SERPL W P-5'-P-CCNC: 16 U/L (ref 0–50)
ANION GAP SERPL CALCULATED.3IONS-SCNC: 5 MMOL/L (ref 3–14)
AST SERPL W P-5'-P-CCNC: 14 U/L (ref 0–45)
BILIRUB SERPL-MCNC: 0.4 MG/DL (ref 0.2–1.3)
BUN SERPL-MCNC: 13 MG/DL (ref 7–30)
CALCIUM SERPL-MCNC: 10.5 MG/DL (ref 8.5–10.1)
CHLORIDE SERPL-SCNC: 109 MMOL/L (ref 94–109)
CO2 SERPL-SCNC: 27 MMOL/L (ref 20–32)
CREAT SERPL-MCNC: 1.06 MG/DL (ref 0.52–1.04)
GFR SERPL CREATININE-BSD FRML MDRD: 49 ML/MIN/{1.73_M2}
GLUCOSE SERPL-MCNC: 94 MG/DL (ref 70–99)
HBA1C MFR BLD: 5.3 % (ref 0–5.6)
POTASSIUM SERPL-SCNC: 4.2 MMOL/L (ref 3.4–5.3)
PROT SERPL-MCNC: 7.3 G/DL (ref 6.8–8.8)
SODIUM SERPL-SCNC: 141 MMOL/L (ref 133–144)

## 2019-04-26 PROCEDURE — 82306 VITAMIN D 25 HYDROXY: CPT | Performed by: INTERNAL MEDICINE

## 2019-04-26 PROCEDURE — G0439 PPPS, SUBSEQ VISIT: HCPCS | Performed by: INTERNAL MEDICINE

## 2019-04-26 PROCEDURE — 83036 HEMOGLOBIN GLYCOSYLATED A1C: CPT | Performed by: INTERNAL MEDICINE

## 2019-04-26 PROCEDURE — 80053 COMPREHEN METABOLIC PANEL: CPT | Performed by: INTERNAL MEDICINE

## 2019-04-26 PROCEDURE — 36415 COLL VENOUS BLD VENIPUNCTURE: CPT | Performed by: INTERNAL MEDICINE

## 2019-04-26 SDOH — HEALTH STABILITY: MENTAL HEALTH: HOW OFTEN DO YOU HAVE A DRINK CONTAINING ALCOHOL?: MONTHLY OR LESS

## 2019-04-26 SDOH — HEALTH STABILITY: MENTAL HEALTH: HOW OFTEN DO YOU HAVE 6 OR MORE DRINKS ON ONE OCCASION?: NEVER

## 2019-04-26 SDOH — HEALTH STABILITY: MENTAL HEALTH: HOW MANY STANDARD DRINKS CONTAINING ALCOHOL DO YOU HAVE ON A TYPICAL DAY?: 1 OR 2

## 2019-04-26 ASSESSMENT — MIFFLIN-ST. JEOR: SCORE: 1090.7

## 2019-04-26 ASSESSMENT — ACTIVITIES OF DAILY LIVING (ADL): CURRENT_FUNCTION: MONEY MANAGEMENT REQUIRES ASSISTANCE
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
## 2019-04-26 NOTE — LETTER
04/29/19      Destiny ROSAS Patricia  [address] [facility] [address]        Dear ,    It was a pleasure meeting you the other day! I hope this finds you well.    I wanted to let you know that your labs came back.    There were some mild abnormalities (mildly elevated calcium, mildly decreased kidney function) seen in your labs, but no medication or interventions are indicated at this time.     We should continue to monitor these annually.     Most of your labs were within normal limits.     Please feel free to contact me with any questions or concerns.      Take care,    Jessica Perez MD   [place] [address] [address] [address]  T: [phone], F: [phone]

## 2019-04-26 NOTE — PATIENT INSTRUCTIONS
Tetanus booster and Shingrix series recommended (but get in Pharmacy and check re: coverage and prices).     ---    Non-fasting labs today.

## 2019-04-29 LAB — DEPRECATED CALCIDIOL+CALCIFEROL SERPL-MC: 31 UG/L (ref 20–75)

## 2019-05-09 DIAGNOSIS — I10 ESSENTIAL HYPERTENSION: ICD-10-CM

## 2019-05-09 RX ORDER — LOSARTAN POTASSIUM 100 MG/1
TABLET ORAL
Qty: 90 TABLET | Refills: 3 | Status: SHIPPED | OUTPATIENT
Start: 2019-05-09 | End: 2021-07-06

## 2019-05-09 NOTE — TELEPHONE ENCOUNTER
"Requested Prescriptions   Pending Prescriptions Disp Refills     losartan (COZAAR) 100 MG tablet [Pharmacy Med Name: LOSARTAN POTASSIUM 100 MG TAB] 30 tablet 0     Sig: TAKE 1 TABLET BY MOUTH EVERY DAY       Angiotensin-II Receptors Failed - 5/9/2019  5:48 AM        Failed - Normal serum creatinine on file in past 12 months     Recent Labs   Lab Test 04/26/19  1358   CR 1.06*             Passed - Blood pressure under 140/90 in past 12 months     BP Readings from Last 3 Encounters:   04/26/19 118/70   03/16/18 112/72   02/06/17 120/78                 Passed - Recent (12 mo) or future (30 days) visit within the authorizing provider's specialty     Patient had office visit in the last 12 months or has a visit in the next 30 days with authorizing provider or within the authorizing provider's specialty.  See \"Patient Info\" tab in inbasket, or \"Choose Columns\" in Meds & Orders section of the refill encounter.              Passed - Medication is active on med list        Passed - Patient is age 18 or older        Passed - No active pregnancy on record        Passed - Normal serum potassium on file in past 12 months     Recent Labs   Lab Test 04/26/19  1358   POTASSIUM 4.2                    Passed - No positive pregnancy test in past 12 months        Routing refill request to provider for review/approval because:  Labs out of range:  creat        "

## 2019-05-09 NOTE — TELEPHONE ENCOUNTER
Patient just seen by partner, creat minimally elevated.   OK to continue angiotensin receptor blocker.

## 2021-01-01 ENCOUNTER — DOCUMENTATION ONLY (OUTPATIENT)
Dept: OTHER | Facility: CLINIC | Age: 86
End: 2021-01-01
Payer: MEDICARE

## 2021-01-01 ENCOUNTER — MYC MEDICAL ADVICE (OUTPATIENT)
Dept: INTERNAL MEDICINE | Facility: CLINIC | Age: 86
End: 2021-01-01
Payer: MEDICARE

## 2021-02-16 ENCOUNTER — IMMUNIZATION (OUTPATIENT)
Dept: NURSING | Facility: CLINIC | Age: 86
End: 2021-02-16
Payer: MEDICARE

## 2021-02-16 PROCEDURE — 0001A PR COVID VAC PFIZER DIL RECON 30 MCG/0.3 ML IM: CPT

## 2021-02-16 PROCEDURE — 91300 PR COVID VAC PFIZER DIL RECON 30 MCG/0.3 ML IM: CPT

## 2021-03-09 ENCOUNTER — IMMUNIZATION (OUTPATIENT)
Dept: NURSING | Facility: CLINIC | Age: 86
End: 2021-03-09
Attending: INTERNAL MEDICINE
Payer: MEDICARE

## 2021-03-09 PROCEDURE — 91300 PR COVID VAC PFIZER DIL RECON 30 MCG/0.3 ML IM: CPT

## 2021-03-09 PROCEDURE — 0002A PR COVID VAC PFIZER DIL RECON 30 MCG/0.3 ML IM: CPT

## 2021-03-13 ENCOUNTER — HEALTH MAINTENANCE LETTER (OUTPATIENT)
Age: 86
End: 2021-03-13

## 2021-07-06 ENCOUNTER — MYC MEDICAL ADVICE (OUTPATIENT)
Dept: INTERNAL MEDICINE | Facility: CLINIC | Age: 86
End: 2021-07-06

## 2021-07-06 DIAGNOSIS — I10 ESSENTIAL HYPERTENSION: ICD-10-CM

## 2021-07-09 NOTE — TELEPHONE ENCOUNTER
Dr. Perez please review [employer] messages. Ok to refill? Pt has not had appt or labs since 2019

## 2021-07-11 RX ORDER — LOSARTAN POTASSIUM 100 MG/1
100 TABLET ORAL DAILY
Qty: 90 TABLET | Refills: 0 | Status: SHIPPED | OUTPATIENT
Start: 2021-07-11 | End: 2021-08-10

## 2021-08-10 ENCOUNTER — OFFICE VISIT (OUTPATIENT)
Dept: INTERNAL MEDICINE | Facility: CLINIC | Age: 86
End: 2021-08-10
Payer: MEDICARE

## 2021-08-10 VITALS
RESPIRATION RATE: 18 BRPM | HEIGHT: 66 IN | TEMPERATURE: 97.7 F | BODY MASS INDEX: 23.95 KG/M2 | OXYGEN SATURATION: 96 % | DIASTOLIC BLOOD PRESSURE: 80 MMHG | WEIGHT: 149 LBS | SYSTOLIC BLOOD PRESSURE: 132 MMHG | HEART RATE: 69 BPM

## 2021-08-10 DIAGNOSIS — E55.9 VITAMIN D DEFICIENCY: ICD-10-CM

## 2021-08-10 DIAGNOSIS — Z13.1 SCREENING FOR DIABETES MELLITUS: ICD-10-CM

## 2021-08-10 DIAGNOSIS — Z78.0 ASYMPTOMATIC MENOPAUSE: ICD-10-CM

## 2021-08-10 DIAGNOSIS — N18.31 STAGE 3A CHRONIC KIDNEY DISEASE (H): ICD-10-CM

## 2021-08-10 DIAGNOSIS — Z00.00 MEDICARE ANNUAL WELLNESS VISIT, SUBSEQUENT: Primary | ICD-10-CM

## 2021-08-10 DIAGNOSIS — I10 ESSENTIAL HYPERTENSION: ICD-10-CM

## 2021-08-10 PROCEDURE — G0439 PPPS, SUBSEQ VISIT: HCPCS | Performed by: INTERNAL MEDICINE

## 2021-08-10 RX ORDER — MULTIPLE VITAMINS W/ MINERALS TAB 9MG-400MCG
TAB ORAL
COMMUNITY
End: 2021-08-10

## 2021-08-10 RX ORDER — LOSARTAN POTASSIUM 100 MG/1
100 TABLET ORAL DAILY
Qty: 90 TABLET | Refills: 3 | Status: SHIPPED | OUTPATIENT
Start: 2021-08-10 | End: 2022-01-01

## 2021-08-10 ASSESSMENT — MIFFLIN-ST. JEOR: SCORE: 1139.67

## 2021-08-10 NOTE — PROGRESS NOTES
ASSESSMENT/PLAN                                                       (Z00.00) Medicare annual wellness visit, subsequent  (primary encounter diagnosis)  Comment: PMH, PSH, FH, SH, medications, allergies, immunizations, and preventative health measures reviewed and updated as appropriate.  Plan: see below for plans.      (Z13.1) Screening for diabetes mellitus  (E55.9) Vitamin D deficiency  (N18.31) Stage 3a chronic kidney disease  Plan: fasting labs ordered - patient to schedule.     (Z78.0) Asymptomatic menopause  Plan: DEXA ordered - patient to schedule.     (I10) Essential hypertension  Comment: well-controlled on current regimen.    Plan: continue present management; refills provided.     Appropriate preventive services were discussed with this patient, including applicable screening as appropriate for cardiovascular disease, diabetes, osteopenia/osteoporosis, and glaucoma.  As appropriate for age/gender, discussed screening for colorectal cancer, prostate cancer, breast cancer, and cervical cancer. Checklist reviewing preventive services available has been given to the patient.    Reviewed patients plan of care. The Basic Care Plan (routine screening as documented in Health Maintenance) for Destiny Gomez meets the Care Plan requirement. This Care Plan has been established and reviewed with the Patient.    Jessica Perez MD   [address] [address]  T: [phone], F: [phone]    SUBJECTIVE                                                      Destiny Gomez is a very pleasant 85 year old female who presents for her subsequent AWV:    Accompanied by daughter.     Current providers (other than myself): n/a     PMH, PSH, FH, SH, medications, allergies, immunizations, preventative health, and health risk assessment reviewed and updated as appropriate.    Past Medical History:   Diagnosis Date     Benign essential hypertension      Chronic kidney disease (CKD),  stage III (moderate)      History of basal cell carcinoma (BCC) 2010    left lower eyelid     History of breast cancer     bilateral breast CA s/p bilateral radical mastectomy and Tamoxifen     MCI (mild cognitive impairment)      Past Surgical History:   Procedure Laterality Date     KNEE SURGERY Left 1960s    after ski accident; details unknown     MASTECTOMY MODIFIED RADICAL Bilateral      Family History   Problem Relation Age of Onset     Diabetes Type 2  Mother      Alzheimer Disease Mother      Kidney Disease Sister         ESRD     Myocardial Infarction No family hx of      Cerebrovascular Disease No family hx of      Coronary Artery Disease Early Onset No family hx of      Breast Cancer No family hx of      Colon Cancer No family hx of      Ovarian Cancer No family hx of      Social History     Occupational History     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    Living at home alone.    Two children.     1 grand children.    Active (does housework and gardens).     No Known Allergies     Current Outpatient Medications   Medication Sig     losartan (COZAAR) 100 MG tablet Take 1 tablet (100 mg) by mouth daily     Immunization History   Administered Date(s) Administered     COVID-19,PF,Pfizer 2021, 2021     Influenza (High Dose) 3 valent vaccine 11/15/2014, 2015     Influenza (IIV3) PF 2004, 2006, 2011, 2012, 10/01/2013, 2016     Pneumo Conj 13-V (2010&after) 2015     Pneumococcal 23 valent 2002, 2009     TD (ADULT, 7+) 1999, 2009     PREVENTATIVE HEALTH           "                                            BMI: within normal limits   Blood pressure: well-controlled on current regimen   Breast CA screening: n/a   Colon CA screening: screening no longer indicated  Lung CA screening: patient does not meet screening criteria  Dexa: DUE  Screening cholesterol: screening no longer indicated   Screening diabetes: DUE  Alcohol misuse screening: alcohol use reviewed - no intervention indicated at this time  Immunizations: reviewed; Shingrix series DUE - not covered by Medicare (may obtain in pharmacy if desired)  and tetanus booster DUE - not covered by Medicare (may obtain in pharmacy if desired)     HEALTH RISK ASSESSMENT                                                      In general, how would you rate your overall physical health? good  Outside of work, how many days during the week do you exercise? 1 day/week  Outside of work, approximately how many minutes a day do you exercise? 15-30 minutes    If you drink alcohol do you typically have >3 drinks per day or >7 drinks per week? No  Do you usually eat at least 4 servings of fruit and vegetables a day, include whole grains & fiber and avoid regularly eating high fat or \"junk\" foods? No     Do you have any problems taking medications regularly? No  Do you have any side effects from medications? No    Assistance with daily activities: YES - transportation    Safety concerns: No    Fall risk assessment: completed today (see ambulatory assessments)    Hearing concerns: No    In the past 6 months, have you been bothered by leaking of urine: No    In general, how would you rate your overall mental or emotional health: good    PHQ-2/PHQ-9 assessment: completed today (see ambulatory assessments)    Additional concerns today: No    OBJECTIVE                                                      /80 (BP Location: Left arm, Patient Position: Chair, Cuff Size: Adult Regular)   Pulse 69   Temp 97.7  F (36.5  C) (Temporal)   Resp 18  " " Ht 1.664 m (5' 5.5\")   Wt 67.6 kg (149 lb)   SpO2 96%   BMI 24.42 kg/m    Constitutional: well-appearing  Head, Ears, and Eyes: normocephalic; normal external auditory canal and pinna; tympanic membranes visualized and normal; normal lids and conjunctivae  Neck: supple, symmetric, no thyromegaly or lymphadenopathy  Respiratory: normal respiratory effort; clear to auscultation bilaterally  Cardiovascular: regular rate and rhythm; no edema  Gastrointestinal: soft, non-tender, and non-distended; no organomegaly or masses  Musculoskeletal: normal gait and station  Psych: normal judgment and insight; normal mood and affect; recent and remote memory intact    Cognitive impairment noted: YES - mild cognitive impairment noted  ---  (Note was completed, in part, with [employer] voice-recognition software. Documentation was reviewed, but some grammatical, spelling, and word errors may remain.)  "

## 2021-08-10 NOTE — PATIENT INSTRUCTIONS
Please schedule DEXA and fasting labs when able (via [facility]).    May stop all supplements - not needed.    Refills of Losartan sent to pharmacy.

## 2021-08-27 ENCOUNTER — LAB (OUTPATIENT)
Dept: LAB | Facility: CLINIC | Age: 86
End: 2021-08-27
Payer: MEDICARE

## 2021-08-27 DIAGNOSIS — E55.9 VITAMIN D DEFICIENCY: ICD-10-CM

## 2021-08-27 DIAGNOSIS — Z13.1 SCREENING FOR DIABETES MELLITUS: ICD-10-CM

## 2021-08-27 LAB
ALBUMIN SERPL-MCNC: 3.6 G/DL (ref 3.4–5)
ALP SERPL-CCNC: 65 U/L (ref 40–150)
ALT SERPL W P-5'-P-CCNC: 20 U/L (ref 0–50)
ANION GAP SERPL CALCULATED.3IONS-SCNC: 2 MMOL/L (ref 3–14)
AST SERPL W P-5'-P-CCNC: 13 U/L (ref 0–45)
BILIRUB SERPL-MCNC: 0.5 MG/DL (ref 0.2–1.3)
BUN SERPL-MCNC: 14 MG/DL (ref 7–30)
CALCIUM SERPL-MCNC: 10.7 MG/DL (ref 8.5–10.1)
CHLORIDE BLD-SCNC: 107 MMOL/L (ref 94–109)
CO2 SERPL-SCNC: 29 MMOL/L (ref 20–32)
CREAT SERPL-MCNC: 1 MG/DL (ref 0.52–1.04)
DEPRECATED CALCIDIOL+CALCIFEROL SERPL-MC: 30 UG/L (ref 20–75)
GFR SERPL CREATININE-BSD FRML MDRD: 52 ML/MIN/1.73M2
GLUCOSE BLD-MCNC: 115 MG/DL (ref 70–99)
POTASSIUM BLD-SCNC: 4.7 MMOL/L (ref 3.4–5.3)
PROT SERPL-MCNC: 7.6 G/DL (ref 6.8–8.8)
SODIUM SERPL-SCNC: 138 MMOL/L (ref 133–144)

## 2021-08-27 PROCEDURE — 36415 COLL VENOUS BLD VENIPUNCTURE: CPT

## 2021-08-27 PROCEDURE — 80053 COMPREHEN METABOLIC PANEL: CPT

## 2021-08-27 PROCEDURE — 82306 VITAMIN D 25 HYDROXY: CPT

## 2021-10-23 ENCOUNTER — HEALTH MAINTENANCE LETTER (OUTPATIENT)
Age: 86
End: 2021-10-23

## 2022-01-01 ENCOUNTER — APPOINTMENT (OUTPATIENT)
Dept: PHYSICAL THERAPY | Facility: CLINIC | Age: 87
DRG: 871 | End: 2022-01-01
Payer: MEDICARE

## 2022-01-01 ENCOUNTER — APPOINTMENT (OUTPATIENT)
Dept: LAB | Facility: CLINIC | Age: 87
End: 2022-01-01
Payer: MEDICARE

## 2022-01-01 ENCOUNTER — MYC MEDICAL ADVICE (OUTPATIENT)
Dept: INTERNAL MEDICINE | Facility: CLINIC | Age: 87
End: 2022-01-01

## 2022-01-01 ENCOUNTER — VIRTUAL VISIT (OUTPATIENT)
Dept: UROLOGY | Facility: CLINIC | Age: 87
End: 2022-01-01
Attending: NURSE PRACTITIONER
Payer: MEDICARE

## 2022-01-01 ENCOUNTER — APPOINTMENT (OUTPATIENT)
Dept: CT IMAGING | Facility: CLINIC | Age: 87
DRG: 871 | End: 2022-01-01
Attending: EMERGENCY MEDICINE
Payer: MEDICARE

## 2022-01-01 ENCOUNTER — TELEPHONE (OUTPATIENT)
Dept: INTERNAL MEDICINE | Facility: CLINIC | Age: 87
End: 2022-01-01

## 2022-01-01 ENCOUNTER — APPOINTMENT (OUTPATIENT)
Dept: ULTRASOUND IMAGING | Facility: CLINIC | Age: 87
DRG: 871 | End: 2022-01-01
Attending: STUDENT IN AN ORGANIZED HEALTH CARE EDUCATION/TRAINING PROGRAM
Payer: MEDICARE

## 2022-01-01 ENCOUNTER — MEDICAL CORRESPONDENCE (OUTPATIENT)
Dept: HEALTH INFORMATION MANAGEMENT | Facility: CLINIC | Age: 87
End: 2022-01-01

## 2022-01-01 ENCOUNTER — OFFICE VISIT (OUTPATIENT)
Dept: URGENT CARE | Facility: URGENT CARE | Age: 87
End: 2022-01-01
Payer: MEDICARE

## 2022-01-01 ENCOUNTER — APPOINTMENT (OUTPATIENT)
Dept: GENERAL RADIOLOGY | Facility: CLINIC | Age: 87
DRG: 871 | End: 2022-01-01
Attending: EMERGENCY MEDICINE
Payer: MEDICARE

## 2022-01-01 ENCOUNTER — DOCUMENTATION ONLY (OUTPATIENT)
Dept: OTHER | Facility: CLINIC | Age: 87
End: 2022-01-01

## 2022-01-01 ENCOUNTER — APPOINTMENT (OUTPATIENT)
Dept: GENERAL RADIOLOGY | Facility: CLINIC | Age: 87
DRG: 871 | End: 2022-01-01
Attending: STUDENT IN AN ORGANIZED HEALTH CARE EDUCATION/TRAINING PROGRAM
Payer: MEDICARE

## 2022-01-01 ENCOUNTER — NURSE TRIAGE (OUTPATIENT)
Dept: INTERNAL MEDICINE | Facility: CLINIC | Age: 87
End: 2022-01-01
Payer: MEDICARE

## 2022-01-01 ENCOUNTER — ASSISTED LIVING VISIT (OUTPATIENT)
Dept: GERIATRICS | Facility: CLINIC | Age: 87
End: 2022-01-01
Payer: MEDICARE

## 2022-01-01 ENCOUNTER — APPOINTMENT (OUTPATIENT)
Dept: GENERAL RADIOLOGY | Facility: CLINIC | Age: 87
End: 2022-01-01
Attending: EMERGENCY MEDICINE
Payer: MEDICARE

## 2022-01-01 ENCOUNTER — MYC MEDICAL ADVICE (OUTPATIENT)
Dept: INTERNAL MEDICINE | Facility: CLINIC | Age: 87
End: 2022-01-01
Payer: MEDICARE

## 2022-01-01 ENCOUNTER — OFFICE VISIT (OUTPATIENT)
Dept: UROLOGY | Facility: CLINIC | Age: 87
End: 2022-01-01

## 2022-01-01 ENCOUNTER — ANCILLARY PROCEDURE (OUTPATIENT)
Dept: CT IMAGING | Facility: CLINIC | Age: 87
End: 2022-01-01
Attending: PHYSICIAN ASSISTANT
Payer: MEDICARE

## 2022-01-01 ENCOUNTER — HOSPITAL ENCOUNTER (OUTPATIENT)
Facility: CLINIC | Age: 87
End: 2022-01-01
Attending: UROLOGY | Admitting: UROLOGY
Payer: MEDICARE

## 2022-01-01 ENCOUNTER — TELEPHONE (OUTPATIENT)
Dept: UROLOGY | Facility: CLINIC | Age: 87
End: 2022-01-01
Payer: MEDICARE

## 2022-01-01 ENCOUNTER — TELEPHONE (OUTPATIENT)
Dept: UROLOGY | Facility: CLINIC | Age: 87
End: 2022-01-01

## 2022-01-01 ENCOUNTER — OFFICE VISIT (OUTPATIENT)
Dept: INTERNAL MEDICINE | Facility: CLINIC | Age: 87
End: 2022-01-01
Payer: MEDICARE

## 2022-01-01 ENCOUNTER — HOSPITAL ENCOUNTER (INPATIENT)
Facility: CLINIC | Age: 87
LOS: 7 days | Discharge: NURSING, REHAB OR LONG-TERM CARE | DRG: 871 | End: 2022-10-11
Attending: EMERGENCY MEDICINE | Admitting: INTERNAL MEDICINE
Payer: MEDICARE

## 2022-01-01 ENCOUNTER — HEALTH MAINTENANCE LETTER (OUTPATIENT)
Age: 87
End: 2022-01-01

## 2022-01-01 ENCOUNTER — HOSPITAL ENCOUNTER (EMERGENCY)
Facility: CLINIC | Age: 87
Discharge: HOME | End: 2022-06-03
Attending: EMERGENCY MEDICINE | Admitting: EMERGENCY MEDICINE
Payer: MEDICARE

## 2022-01-01 VITALS
HEART RATE: 73 BPM | SYSTOLIC BLOOD PRESSURE: 110 MMHG | WEIGHT: 156 LBS | BODY MASS INDEX: 25.99 KG/M2 | HEIGHT: 65 IN | OXYGEN SATURATION: 95 % | DIASTOLIC BLOOD PRESSURE: 70 MMHG

## 2022-01-01 VITALS
TEMPERATURE: 97.9 F | HEIGHT: 65 IN | HEART RATE: 87 BPM | WEIGHT: 147 LBS | BODY MASS INDEX: 24.49 KG/M2 | RESPIRATION RATE: 20 BRPM | OXYGEN SATURATION: 92 % | SYSTOLIC BLOOD PRESSURE: 124 MMHG | DIASTOLIC BLOOD PRESSURE: 74 MMHG

## 2022-01-01 VITALS
DIASTOLIC BLOOD PRESSURE: 76 MMHG | TEMPERATURE: 97.8 F | SYSTOLIC BLOOD PRESSURE: 165 MMHG | OXYGEN SATURATION: 99 % | HEART RATE: 66 BPM

## 2022-01-01 VITALS
DIASTOLIC BLOOD PRESSURE: 80 MMHG | TEMPERATURE: 97.5 F | RESPIRATION RATE: 16 BRPM | OXYGEN SATURATION: 99 % | WEIGHT: 157 LBS | HEART RATE: 65 BPM | SYSTOLIC BLOOD PRESSURE: 140 MMHG | BODY MASS INDEX: 25.73 KG/M2

## 2022-01-01 VITALS
BODY MASS INDEX: 24.61 KG/M2 | DIASTOLIC BLOOD PRESSURE: 57 MMHG | HEART RATE: 86 BPM | HEIGHT: 65 IN | OXYGEN SATURATION: 97 % | SYSTOLIC BLOOD PRESSURE: 118 MMHG | RESPIRATION RATE: 18 BRPM | WEIGHT: 147.71 LBS | TEMPERATURE: 97.7 F

## 2022-01-01 VITALS
HEART RATE: 66 BPM | WEIGHT: 149 LBS | DIASTOLIC BLOOD PRESSURE: 87 MMHG | RESPIRATION RATE: 11 BRPM | HEIGHT: 65 IN | BODY MASS INDEX: 24.83 KG/M2 | OXYGEN SATURATION: 96 % | SYSTOLIC BLOOD PRESSURE: 160 MMHG | TEMPERATURE: 98 F

## 2022-01-01 VITALS
RESPIRATION RATE: 14 BRPM | DIASTOLIC BLOOD PRESSURE: 72 MMHG | OXYGEN SATURATION: 96 % | HEART RATE: 78 BPM | BODY MASS INDEX: 26.03 KG/M2 | WEIGHT: 156.4 LBS | SYSTOLIC BLOOD PRESSURE: 126 MMHG

## 2022-01-01 VITALS
BODY MASS INDEX: 24.49 KG/M2 | TEMPERATURE: 98.1 F | DIASTOLIC BLOOD PRESSURE: 63 MMHG | SYSTOLIC BLOOD PRESSURE: 115 MMHG | RESPIRATION RATE: 16 BRPM | HEART RATE: 76 BPM | WEIGHT: 147 LBS | OXYGEN SATURATION: 92 % | HEIGHT: 65 IN

## 2022-01-01 VITALS
OXYGEN SATURATION: 97 % | WEIGHT: 156 LBS | SYSTOLIC BLOOD PRESSURE: 128 MMHG | TEMPERATURE: 97.9 F | DIASTOLIC BLOOD PRESSURE: 66 MMHG | BODY MASS INDEX: 25.56 KG/M2 | RESPIRATION RATE: 18 BRPM | HEART RATE: 61 BPM

## 2022-01-01 VITALS — HEIGHT: 65 IN | BODY MASS INDEX: 24.16 KG/M2 | WEIGHT: 145 LBS

## 2022-01-01 VITALS
RESPIRATION RATE: 15 BRPM | DIASTOLIC BLOOD PRESSURE: 71 MMHG | TEMPERATURE: 97.4 F | OXYGEN SATURATION: 99 % | HEART RATE: 76 BPM | SYSTOLIC BLOOD PRESSURE: 133 MMHG | WEIGHT: 157 LBS | BODY MASS INDEX: 25.73 KG/M2

## 2022-01-01 VITALS
BODY MASS INDEX: 24.46 KG/M2 | TEMPERATURE: 98 F | WEIGHT: 147 LBS | DIASTOLIC BLOOD PRESSURE: 92 MMHG | HEART RATE: 121 BPM | RESPIRATION RATE: 18 BRPM | SYSTOLIC BLOOD PRESSURE: 122 MMHG

## 2022-01-01 VITALS
TEMPERATURE: 99 F | DIASTOLIC BLOOD PRESSURE: 72 MMHG | HEART RATE: 65 BPM | OXYGEN SATURATION: 95 % | SYSTOLIC BLOOD PRESSURE: 146 MMHG

## 2022-01-01 DIAGNOSIS — E83.42 HYPOMAGNESEMIA: ICD-10-CM

## 2022-01-01 DIAGNOSIS — N30.01 ACUTE CYSTITIS WITH HEMATURIA: ICD-10-CM

## 2022-01-01 DIAGNOSIS — N30.01 ACUTE CYSTITIS WITH HEMATURIA: Primary | ICD-10-CM

## 2022-01-01 DIAGNOSIS — F02.C4 SEVERE LATE ONSET ALZHEIMER'S DEMENTIA WITH ANXIETY (H): Primary | ICD-10-CM

## 2022-01-01 DIAGNOSIS — R33.9 URINARY RETENTION: ICD-10-CM

## 2022-01-01 DIAGNOSIS — N17.9 ACUTE RENAL FAILURE, UNSPECIFIED ACUTE RENAL FAILURE TYPE (H): ICD-10-CM

## 2022-01-01 DIAGNOSIS — F02.C4 SEVERE LATE ONSET ALZHEIMER'S DEMENTIA WITH ANXIETY (H): ICD-10-CM

## 2022-01-01 DIAGNOSIS — R35.0 URINARY FREQUENCY: ICD-10-CM

## 2022-01-01 DIAGNOSIS — N39.0 ACUTE UTI: ICD-10-CM

## 2022-01-01 DIAGNOSIS — G30.1 SEVERE LATE ONSET ALZHEIMER'S DEMENTIA WITH ANXIETY (H): Primary | ICD-10-CM

## 2022-01-01 DIAGNOSIS — Z85.3 PERSONAL HISTORY OF MALIGNANT NEOPLASM OF BREAST: ICD-10-CM

## 2022-01-01 DIAGNOSIS — R30.0 DYSURIA: Primary | ICD-10-CM

## 2022-01-01 DIAGNOSIS — Z46.6 URINARY CATHETER (FOLEY) CHANGE REQUIRED: ICD-10-CM

## 2022-01-01 DIAGNOSIS — Z71.89 ADVANCED DIRECTIVES, COUNSELING/DISCUSSION: ICD-10-CM

## 2022-01-01 DIAGNOSIS — M62.81 GENERALIZED MUSCLE WEAKNESS: ICD-10-CM

## 2022-01-01 DIAGNOSIS — F41.1 GAD (GENERALIZED ANXIETY DISORDER): Primary | ICD-10-CM

## 2022-01-01 DIAGNOSIS — I95.9 HYPOTENSION, UNSPECIFIED HYPOTENSION TYPE: ICD-10-CM

## 2022-01-01 DIAGNOSIS — R07.9 ACUTE CHEST PAIN: Primary | ICD-10-CM

## 2022-01-01 DIAGNOSIS — N12 PYELONEPHRITIS: Primary | ICD-10-CM

## 2022-01-01 DIAGNOSIS — Z11.52 ENCOUNTER FOR SCREENING LABORATORY TESTING FOR SEVERE ACUTE RESPIRATORY SYNDROME CORONAVIRUS 2 (SARS-COV-2): ICD-10-CM

## 2022-01-01 DIAGNOSIS — N30.00 ACUTE CYSTITIS WITHOUT HEMATURIA: Primary | ICD-10-CM

## 2022-01-01 DIAGNOSIS — Z97.8 FOLEY CATHETER IN PLACE: ICD-10-CM

## 2022-01-01 DIAGNOSIS — I10 ESSENTIAL HYPERTENSION: ICD-10-CM

## 2022-01-01 DIAGNOSIS — R09.81 CHRONIC NASAL CONGESTION: ICD-10-CM

## 2022-01-01 DIAGNOSIS — R31.0 GROSS HEMATURIA: ICD-10-CM

## 2022-01-01 DIAGNOSIS — N20.0 KIDNEY STONE: ICD-10-CM

## 2022-01-01 DIAGNOSIS — Z86.59 HISTORY OF DEMENTIA: ICD-10-CM

## 2022-01-01 DIAGNOSIS — R07.9 CHEST PAIN, UNSPECIFIED TYPE: ICD-10-CM

## 2022-01-01 DIAGNOSIS — R39.89 SUSPECTED UTI: ICD-10-CM

## 2022-01-01 DIAGNOSIS — R31.29 MICROSCOPIC HEMATURIA: Primary | ICD-10-CM

## 2022-01-01 DIAGNOSIS — G30.1 SEVERE LATE ONSET ALZHEIMER'S DEMENTIA WITH ANXIETY (H): ICD-10-CM

## 2022-01-01 DIAGNOSIS — M62.81 MUSCLE WEAKNESS (GENERALIZED): ICD-10-CM

## 2022-01-01 DIAGNOSIS — N12 PYELONEPHRITIS: ICD-10-CM

## 2022-01-01 DIAGNOSIS — Z51.5 HOSPICE CARE PATIENT: ICD-10-CM

## 2022-01-01 DIAGNOSIS — L89.152 PRESSURE INJURY OF SACRAL REGION, STAGE 2 (H): ICD-10-CM

## 2022-01-01 DIAGNOSIS — R79.89 ELEVATED TROPONIN LEVEL: ICD-10-CM

## 2022-01-01 DIAGNOSIS — R21 RASH: ICD-10-CM

## 2022-01-01 DIAGNOSIS — N39.0 URINARY TRACT INFECTION WITHOUT HEMATURIA, SITE UNSPECIFIED: Primary | ICD-10-CM

## 2022-01-01 DIAGNOSIS — R30.0 DYSURIA: ICD-10-CM

## 2022-01-01 DIAGNOSIS — F41.1 GAD (GENERALIZED ANXIETY DISORDER): ICD-10-CM

## 2022-01-01 DIAGNOSIS — R79.89 TROPONIN LEVEL ELEVATED: ICD-10-CM

## 2022-01-01 DIAGNOSIS — N20.0 CALCULUS OF KIDNEY: Primary | ICD-10-CM

## 2022-01-01 DIAGNOSIS — R82.81 PYURIA: ICD-10-CM

## 2022-01-01 DIAGNOSIS — R35.0 URINARY FREQUENCY: Primary | ICD-10-CM

## 2022-01-01 DIAGNOSIS — R00.1 BRADYCARDIA: ICD-10-CM

## 2022-01-01 DIAGNOSIS — I10 PRIMARY HYPERTENSION: ICD-10-CM

## 2022-01-01 DIAGNOSIS — N17.9 ACUTE KIDNEY INJURY (H): ICD-10-CM

## 2022-01-01 DIAGNOSIS — N20.0 CALCULUS OF KIDNEY: ICD-10-CM

## 2022-01-01 DIAGNOSIS — N10 PYELONEPHRITIS, ACUTE: Primary | ICD-10-CM

## 2022-01-01 DIAGNOSIS — D72.829 LEUKOCYTOSIS, UNSPECIFIED TYPE: ICD-10-CM

## 2022-01-01 DIAGNOSIS — R29.6 RECURRENT FALLS: ICD-10-CM

## 2022-01-01 LAB
ALBUMIN SERPL BCG-MCNC: 2.5 G/DL (ref 3.5–5.2)
ALBUMIN SERPL BCG-MCNC: 2.7 G/DL (ref 3.5–5.2)
ALBUMIN SERPL BCG-MCNC: 3.4 G/DL (ref 3.5–5.2)
ALBUMIN SERPL-MCNC: 3.2 G/DL (ref 3.4–5)
ALBUMIN UR-MCNC: 200 MG/DL
ALBUMIN UR-MCNC: 30 MG/DL
ALBUMIN UR-MCNC: >=300 MG/DL
ALBUMIN UR-MCNC: NEGATIVE MG/DL
ALP SERPL-CCNC: 69 U/L (ref 40–150)
ALP SERPL-CCNC: 75 U/L (ref 35–104)
ALP SERPL-CCNC: 86 U/L (ref 35–104)
ALP SERPL-CCNC: 91 U/L (ref 35–104)
ALT SERPL W P-5'-P-CCNC: 10 U/L (ref 10–35)
ALT SERPL W P-5'-P-CCNC: 12 U/L (ref 10–35)
ALT SERPL W P-5'-P-CCNC: 19 U/L (ref 10–35)
ALT SERPL W P-5'-P-CCNC: 23 U/L (ref 0–50)
ANION GAP SERPL CALCULATED.3IONS-SCNC: 10 MMOL/L (ref 7–15)
ANION GAP SERPL CALCULATED.3IONS-SCNC: 10 MMOL/L (ref 7–15)
ANION GAP SERPL CALCULATED.3IONS-SCNC: 11 MMOL/L (ref 7–15)
ANION GAP SERPL CALCULATED.3IONS-SCNC: 12 MMOL/L (ref 7–15)
ANION GAP SERPL CALCULATED.3IONS-SCNC: 16 MMOL/L (ref 7–15)
ANION GAP SERPL CALCULATED.3IONS-SCNC: 6 MMOL/L (ref 3–14)
ANION GAP SERPL CALCULATED.3IONS-SCNC: 6 MMOL/L (ref 7–15)
ANION GAP SERPL CALCULATED.3IONS-SCNC: 7 MMOL/L (ref 7–15)
ANION GAP SERPL CALCULATED.3IONS-SCNC: 9 MMOL/L (ref 7–15)
ANION GAP SERPL CALCULATED.3IONS-SCNC: 9 MMOL/L (ref 7–15)
ANION GAP SERPL CALCULATED.3IONS-SCNC: <1 MMOL/L (ref 3–14)
APPEARANCE UR: ABNORMAL
APPEARANCE UR: CLEAR
AST SERPL W P-5'-P-CCNC: 15 U/L (ref 10–35)
AST SERPL W P-5'-P-CCNC: 19 U/L (ref 0–45)
AST SERPL W P-5'-P-CCNC: 19 U/L (ref 10–35)
AST SERPL W P-5'-P-CCNC: 20 U/L (ref 10–35)
ATRIAL RATE - MUSE: 83 BPM
BACTERIA #/AREA URNS HPF: ABNORMAL /HPF
BACTERIA BLD CULT: NO GROWTH
BACTERIA BLD CULT: NO GROWTH
BACTERIA UR CULT: ABNORMAL
BACTERIA UR CULT: NO GROWTH
BACTERIA UR CULT: NORMAL
BASOPHILS # BLD AUTO: 0 10E3/UL (ref 0–0.2)
BASOPHILS # BLD AUTO: 0.1 10E3/UL (ref 0–0.2)
BASOPHILS # BLD AUTO: 0.1 10E3/UL (ref 0–0.2)
BASOPHILS NFR BLD AUTO: 0 %
BASOPHILS NFR BLD AUTO: 0 %
BASOPHILS NFR BLD AUTO: 1 %
BILIRUB SERPL-MCNC: 0.3 MG/DL
BILIRUB SERPL-MCNC: 0.4 MG/DL (ref 0.2–1.3)
BILIRUB SERPL-MCNC: 0.5 MG/DL
BILIRUB SERPL-MCNC: 0.6 MG/DL
BILIRUB UR QL STRIP: NEGATIVE
BUN SERPL-MCNC: 10.6 MG/DL (ref 8–23)
BUN SERPL-MCNC: 11.6 MG/DL (ref 8–23)
BUN SERPL-MCNC: 12 MG/DL (ref 8–23)
BUN SERPL-MCNC: 12.4 MG/DL (ref 8–23)
BUN SERPL-MCNC: 12.9 MG/DL (ref 8–23)
BUN SERPL-MCNC: 15.1 MG/DL (ref 8–23)
BUN SERPL-MCNC: 17.9 MG/DL (ref 8–23)
BUN SERPL-MCNC: 19 MG/DL (ref 7–30)
BUN SERPL-MCNC: 20 MG/DL (ref 7–30)
BUN SERPL-MCNC: 20.4 MG/DL (ref 8–23)
BUN SERPL-MCNC: 20.4 MG/DL (ref 8–23)
CALCIUM SERPL-MCNC: 10 MG/DL (ref 8.5–10.1)
CALCIUM SERPL-MCNC: 10 MG/DL (ref 8.8–10.2)
CALCIUM SERPL-MCNC: 10.1 MG/DL (ref 8.8–10.2)
CALCIUM SERPL-MCNC: 10.1 MG/DL (ref 8.8–10.2)
CALCIUM SERPL-MCNC: 10.3 MG/DL (ref 8.5–10.1)
CALCIUM SERPL-MCNC: 9.3 MG/DL (ref 8.8–10.2)
CALCIUM SERPL-MCNC: 9.4 MG/DL (ref 8.8–10.2)
CALCIUM SERPL-MCNC: 9.5 MG/DL (ref 8.8–10.2)
CALCIUM SERPL-MCNC: 9.5 MG/DL (ref 8.8–10.2)
CALCIUM SERPL-MCNC: 9.7 MG/DL (ref 8.8–10.2)
CALCIUM SERPL-MCNC: 9.9 MG/DL (ref 8.8–10.2)
CHLORIDE BLD-SCNC: 107 MMOL/L (ref 94–109)
CHLORIDE BLD-SCNC: 108 MMOL/L (ref 94–109)
CHLORIDE SERPL-SCNC: 102 MMOL/L (ref 98–107)
CHLORIDE SERPL-SCNC: 103 MMOL/L (ref 98–107)
CHLORIDE SERPL-SCNC: 104 MMOL/L (ref 98–107)
CHLORIDE SERPL-SCNC: 105 MMOL/L (ref 98–107)
CHLORIDE SERPL-SCNC: 96 MMOL/L (ref 98–107)
CHLORIDE SERPL-SCNC: 96 MMOL/L (ref 98–107)
CO2 SERPL-SCNC: 23 MMOL/L (ref 20–32)
CO2 SERPL-SCNC: 30 MMOL/L (ref 20–32)
COLOR UR AUTO: YELLOW
CREAT SERPL-MCNC: 0.77 MG/DL (ref 0.51–0.95)
CREAT SERPL-MCNC: 0.77 MG/DL (ref 0.51–0.95)
CREAT SERPL-MCNC: 0.79 MG/DL (ref 0.51–0.95)
CREAT SERPL-MCNC: 0.8 MG/DL (ref 0.52–1.04)
CREAT SERPL-MCNC: 0.82 MG/DL (ref 0.52–1.04)
CREAT SERPL-MCNC: 0.83 MG/DL (ref 0.51–0.95)
CREAT SERPL-MCNC: 0.9 MG/DL (ref 0.51–0.95)
CREAT SERPL-MCNC: 0.98 MG/DL (ref 0.51–0.95)
CREAT SERPL-MCNC: 1.18 MG/DL (ref 0.51–0.95)
CREAT SERPL-MCNC: 1.22 MG/DL (ref 0.51–0.95)
CREAT SERPL-MCNC: 1.25 MG/DL (ref 0.51–0.95)
CRP SERPL-MCNC: 148 MG/L
CRP SERPL-MCNC: 202 MG/L
D DIMER PPP FEU-MCNC: 0.45 UG/ML FEU (ref 0–0.5)
DEPRECATED HCO3 PLAS-SCNC: 17 MMOL/L (ref 22–29)
DEPRECATED HCO3 PLAS-SCNC: 19 MMOL/L (ref 22–29)
DEPRECATED HCO3 PLAS-SCNC: 21 MMOL/L (ref 22–29)
DEPRECATED HCO3 PLAS-SCNC: 21 MMOL/L (ref 22–29)
DEPRECATED HCO3 PLAS-SCNC: 23 MMOL/L (ref 22–29)
DEPRECATED HCO3 PLAS-SCNC: 24 MMOL/L (ref 22–29)
DEPRECATED HCO3 PLAS-SCNC: 24 MMOL/L (ref 22–29)
DEPRECATED HCO3 PLAS-SCNC: 25 MMOL/L (ref 22–29)
DEPRECATED HCO3 PLAS-SCNC: 25 MMOL/L (ref 22–29)
DIASTOLIC BLOOD PRESSURE - MUSE: NORMAL MMHG
EOSINOPHIL # BLD AUTO: 0.1 10E3/UL (ref 0–0.7)
EOSINOPHIL # BLD AUTO: 0.2 10E3/UL (ref 0–0.7)
EOSINOPHIL # BLD AUTO: 0.2 10E3/UL (ref 0–0.7)
EOSINOPHIL NFR BLD AUTO: 0 %
EOSINOPHIL NFR BLD AUTO: 2 %
EOSINOPHIL NFR BLD AUTO: 3 %
ERYTHROCYTE [DISTWIDTH] IN BLOOD BY AUTOMATED COUNT: 12.5 % (ref 10–15)
ERYTHROCYTE [DISTWIDTH] IN BLOOD BY AUTOMATED COUNT: 12.8 % (ref 10–15)
ERYTHROCYTE [DISTWIDTH] IN BLOOD BY AUTOMATED COUNT: 12.9 % (ref 10–15)
ERYTHROCYTE [DISTWIDTH] IN BLOOD BY AUTOMATED COUNT: 13 % (ref 10–15)
ERYTHROCYTE [DISTWIDTH] IN BLOOD BY AUTOMATED COUNT: 13 % (ref 10–15)
ERYTHROCYTE [DISTWIDTH] IN BLOOD BY AUTOMATED COUNT: 13.1 % (ref 10–15)
ERYTHROCYTE [DISTWIDTH] IN BLOOD BY AUTOMATED COUNT: 13.2 % (ref 10–15)
GFR SERPL CREATININE-BSD FRML MDRD: 42 ML/MIN/1.73M2
GFR SERPL CREATININE-BSD FRML MDRD: 44 ML/MIN/1.73M2
GFR SERPL CREATININE-BSD FRML MDRD: 45 ML/MIN/1.73M2
GFR SERPL CREATININE-BSD FRML MDRD: 57 ML/MIN/1.73M2
GFR SERPL CREATININE-BSD FRML MDRD: 63 ML/MIN/1.73M2
GFR SERPL CREATININE-BSD FRML MDRD: 69 ML/MIN/1.73M2
GFR SERPL CREATININE-BSD FRML MDRD: 70 ML/MIN/1.73M2
GFR SERPL CREATININE-BSD FRML MDRD: 72 ML/MIN/1.73M2
GFR SERPL CREATININE-BSD FRML MDRD: 73 ML/MIN/1.73M2
GFR SERPL CREATININE-BSD FRML MDRD: 76 ML/MIN/1.73M2
GFR SERPL CREATININE-BSD FRML MDRD: 76 ML/MIN/1.73M2
GLUCOSE BLD-MCNC: 104 MG/DL (ref 70–99)
GLUCOSE BLD-MCNC: 135 MG/DL (ref 70–99)
GLUCOSE SERPL-MCNC: 105 MG/DL (ref 70–99)
GLUCOSE SERPL-MCNC: 107 MG/DL (ref 70–99)
GLUCOSE SERPL-MCNC: 110 MG/DL (ref 70–99)
GLUCOSE SERPL-MCNC: 111 MG/DL (ref 70–99)
GLUCOSE SERPL-MCNC: 114 MG/DL (ref 70–99)
GLUCOSE SERPL-MCNC: 118 MG/DL (ref 70–99)
GLUCOSE SERPL-MCNC: 126 MG/DL (ref 70–99)
GLUCOSE SERPL-MCNC: 130 MG/DL (ref 70–99)
GLUCOSE SERPL-MCNC: 137 MG/DL (ref 70–99)
GLUCOSE UR STRIP-MCNC: NEGATIVE MG/DL
HCT VFR BLD AUTO: 36.2 % (ref 35–47)
HCT VFR BLD AUTO: 37 % (ref 35–47)
HCT VFR BLD AUTO: 37.3 % (ref 35–47)
HCT VFR BLD AUTO: 37.9 % (ref 35–47)
HCT VFR BLD AUTO: 38 % (ref 35–47)
HCT VFR BLD AUTO: 38.1 % (ref 35–47)
HCT VFR BLD AUTO: 39.6 % (ref 35–47)
HCT VFR BLD AUTO: 39.7 % (ref 35–47)
HCT VFR BLD AUTO: 41.8 % (ref 35–47)
HCT VFR BLD AUTO: 46.1 % (ref 35–47)
HGB BLD-MCNC: 11.6 G/DL (ref 11.7–15.7)
HGB BLD-MCNC: 12.2 G/DL (ref 11.7–15.7)
HGB BLD-MCNC: 12.2 G/DL (ref 11.7–15.7)
HGB BLD-MCNC: 12.3 G/DL (ref 11.7–15.7)
HGB BLD-MCNC: 12.5 G/DL (ref 11.7–15.7)
HGB BLD-MCNC: 12.6 G/DL (ref 11.7–15.7)
HGB BLD-MCNC: 12.8 G/DL (ref 11.7–15.7)
HGB BLD-MCNC: 12.9 G/DL (ref 11.7–15.7)
HGB BLD-MCNC: 14 G/DL (ref 11.7–15.7)
HGB BLD-MCNC: 14.3 G/DL (ref 11.7–15.7)
HGB UR QL STRIP: ABNORMAL
IMM GRANULOCYTES # BLD: 0 10E3/UL
IMM GRANULOCYTES # BLD: 0.1 10E3/UL
IMM GRANULOCYTES # BLD: 0.1 10E3/UL
IMM GRANULOCYTES NFR BLD: 0 %
IMM GRANULOCYTES NFR BLD: 1 %
IMM GRANULOCYTES NFR BLD: 1 %
INR PPP: 1.2 (ref 0.85–1.15)
INTERPRETATION ECG - MUSE: NORMAL
KETONES UR STRIP-MCNC: NEGATIVE MG/DL
LACTATE SERPL-SCNC: 1.6 MMOL/L (ref 0.7–2)
LEUKOCYTE ESTERASE UR QL STRIP: ABNORMAL
LIPASE SERPL-CCNC: 358 U/L (ref 73–393)
LYMPHOCYTES # BLD AUTO: 0.9 10E3/UL (ref 0.8–5.3)
LYMPHOCYTES # BLD AUTO: 1.1 10E3/UL (ref 0.8–5.3)
LYMPHOCYTES # BLD AUTO: 1.4 10E3/UL (ref 0.8–5.3)
LYMPHOCYTES NFR BLD AUTO: 21 %
LYMPHOCYTES NFR BLD AUTO: 8 %
LYMPHOCYTES NFR BLD AUTO: 8 %
MAGNESIUM SERPL-MCNC: 1.4 MG/DL (ref 1.7–2.3)
MAGNESIUM SERPL-MCNC: 1.5 MG/DL (ref 1.7–2.3)
MAGNESIUM SERPL-MCNC: 1.9 MG/DL (ref 1.7–2.3)
MAGNESIUM SERPL-MCNC: 2.5 MG/DL (ref 1.7–2.3)
MCH RBC QN AUTO: 28 PG (ref 26.5–33)
MCH RBC QN AUTO: 28.1 PG (ref 26.5–33)
MCH RBC QN AUTO: 28.3 PG (ref 26.5–33)
MCH RBC QN AUTO: 28.3 PG (ref 26.5–33)
MCH RBC QN AUTO: 28.4 PG (ref 26.5–33)
MCH RBC QN AUTO: 28.5 PG (ref 26.5–33)
MCH RBC QN AUTO: 28.6 PG (ref 26.5–33)
MCH RBC QN AUTO: 28.7 PG (ref 26.5–33)
MCHC RBC AUTO-ENTMCNC: 31 G/DL (ref 31.5–36.5)
MCHC RBC AUTO-ENTMCNC: 31.8 G/DL (ref 31.5–36.5)
MCHC RBC AUTO-ENTMCNC: 32 G/DL (ref 31.5–36.5)
MCHC RBC AUTO-ENTMCNC: 32.2 G/DL (ref 31.5–36.5)
MCHC RBC AUTO-ENTMCNC: 32.4 G/DL (ref 31.5–36.5)
MCHC RBC AUTO-ENTMCNC: 32.7 G/DL (ref 31.5–36.5)
MCHC RBC AUTO-ENTMCNC: 32.8 G/DL (ref 31.5–36.5)
MCHC RBC AUTO-ENTMCNC: 33 G/DL (ref 31.5–36.5)
MCHC RBC AUTO-ENTMCNC: 33.5 G/DL (ref 31.5–36.5)
MCHC RBC AUTO-ENTMCNC: 34 G/DL (ref 31.5–36.5)
MCV RBC AUTO: 84 FL (ref 78–100)
MCV RBC AUTO: 85 FL (ref 78–100)
MCV RBC AUTO: 86 FL (ref 78–100)
MCV RBC AUTO: 86 FL (ref 78–100)
MCV RBC AUTO: 87 FL (ref 78–100)
MCV RBC AUTO: 87 FL (ref 78–100)
MCV RBC AUTO: 88 FL (ref 78–100)
MCV RBC AUTO: 93 FL (ref 78–100)
MONOCYTES # BLD AUTO: 0.5 10E3/UL (ref 0–1.3)
MONOCYTES # BLD AUTO: 0.9 10E3/UL (ref 0–1.3)
MONOCYTES # BLD AUTO: 1.1 10E3/UL (ref 0–1.3)
MONOCYTES NFR BLD AUTO: 8 %
NEUTROPHILS # BLD AUTO: 11.5 10E3/UL (ref 1.6–8.3)
NEUTROPHILS # BLD AUTO: 4.6 10E3/UL (ref 1.6–8.3)
NEUTROPHILS # BLD AUTO: 9.5 10E3/UL (ref 1.6–8.3)
NEUTROPHILS NFR BLD AUTO: 67 %
NEUTROPHILS NFR BLD AUTO: 82 %
NEUTROPHILS NFR BLD AUTO: 82 %
NITRATE UR QL: NEGATIVE
NITRATE UR QL: POSITIVE
NRBC # BLD AUTO: 0 10E3/UL
NRBC BLD AUTO-RTO: 0 /100
P AXIS - MUSE: 40 DEGREES
PATH REPORT.COMMENTS IMP SPEC: NORMAL
PATH REPORT.FINAL DX SPEC: NORMAL
PATH REPORT.GROSS SPEC: NORMAL
PATH REPORT.RELEVANT HX SPEC: NORMAL
PH UR STRIP: 5.5 [PH] (ref 5–7)
PH UR STRIP: 6 [PH] (ref 5–7)
PH UR STRIP: 6.5 [PH] (ref 5–7)
PH UR STRIP: 6.5 [PH] (ref 5–7)
PLATELET # BLD AUTO: 199 10E3/UL (ref 150–450)
PLATELET # BLD AUTO: 229 10E3/UL (ref 150–450)
PLATELET # BLD AUTO: 249 10E3/UL (ref 150–450)
PLATELET # BLD AUTO: 265 10E3/UL (ref 150–450)
PLATELET # BLD AUTO: 269 10E3/UL (ref 150–450)
PLATELET # BLD AUTO: 279 10E3/UL (ref 150–450)
PLATELET # BLD AUTO: 282 10E3/UL (ref 150–450)
PLATELET # BLD AUTO: 283 10E3/UL (ref 150–450)
PLATELET # BLD AUTO: 291 10E3/UL (ref 150–450)
PLATELET # BLD AUTO: 341 10E3/UL (ref 150–450)
POTASSIUM BLD-SCNC: 3.6 MMOL/L (ref 3.4–5.3)
POTASSIUM BLD-SCNC: 4.7 MMOL/L (ref 3.4–5.3)
POTASSIUM SERPL-SCNC: 3.4 MMOL/L (ref 3.4–5.3)
POTASSIUM SERPL-SCNC: 3.6 MMOL/L (ref 3.4–5.3)
POTASSIUM SERPL-SCNC: 3.7 MMOL/L (ref 3.4–5.3)
POTASSIUM SERPL-SCNC: 3.8 MMOL/L (ref 3.4–5.3)
POTASSIUM SERPL-SCNC: 3.8 MMOL/L (ref 3.4–5.3)
POTASSIUM SERPL-SCNC: 3.9 MMOL/L (ref 3.4–5.3)
POTASSIUM SERPL-SCNC: 3.9 MMOL/L (ref 3.4–5.3)
POTASSIUM SERPL-SCNC: 4.1 MMOL/L (ref 3.4–5.3)
POTASSIUM SERPL-SCNC: 4.2 MMOL/L (ref 3.4–5.3)
PR INTERVAL - MUSE: 146 MS
PROT SERPL-MCNC: 5.4 G/DL (ref 6.4–8.3)
PROT SERPL-MCNC: 5.6 G/DL (ref 6.4–8.3)
PROT SERPL-MCNC: 6.7 G/DL (ref 6.4–8.3)
PROT SERPL-MCNC: 7.3 G/DL (ref 6.8–8.8)
QRS DURATION - MUSE: 140 MS
QT - MUSE: 406 MS
QTC - MUSE: 477 MS
R AXIS - MUSE: 57 DEGREES
RBC # BLD AUTO: 4.15 10E6/UL (ref 3.8–5.2)
RBC # BLD AUTO: 4.3 10E6/UL (ref 3.8–5.2)
RBC # BLD AUTO: 4.3 10E6/UL (ref 3.8–5.2)
RBC # BLD AUTO: 4.32 10E6/UL (ref 3.8–5.2)
RBC # BLD AUTO: 4.41 10E6/UL (ref 3.8–5.2)
RBC # BLD AUTO: 4.49 10E6/UL (ref 3.8–5.2)
RBC # BLD AUTO: 4.51 10E6/UL (ref 3.8–5.2)
RBC # BLD AUTO: 4.53 10E6/UL (ref 3.8–5.2)
RBC # BLD AUTO: 4.93 10E6/UL (ref 3.8–5.2)
RBC # BLD AUTO: 4.98 10E6/UL (ref 3.8–5.2)
RBC #/AREA URNS AUTO: ABNORMAL /HPF
RBC URINE: 49 /HPF
SARS-COV-2 RNA RESP QL NAA+PROBE: NEGATIVE
SODIUM SERPL-SCNC: 129 MMOL/L (ref 136–145)
SODIUM SERPL-SCNC: 131 MMOL/L (ref 136–145)
SODIUM SERPL-SCNC: 133 MMOL/L (ref 136–145)
SODIUM SERPL-SCNC: 134 MMOL/L (ref 136–145)
SODIUM SERPL-SCNC: 134 MMOL/L (ref 136–145)
SODIUM SERPL-SCNC: 135 MMOL/L (ref 136–145)
SODIUM SERPL-SCNC: 136 MMOL/L (ref 133–144)
SODIUM SERPL-SCNC: 136 MMOL/L (ref 136–145)
SODIUM SERPL-SCNC: 138 MMOL/L (ref 133–144)
SP GR UR STRIP: 1.01 (ref 1–1.03)
SP GR UR STRIP: 1.01 (ref 1–1.03)
SP GR UR STRIP: 1.02 (ref 1–1.03)
SP GR UR STRIP: >=1.03 (ref 1–1.03)
SQUAMOUS #/AREA URNS AUTO: ABNORMAL /LPF
SYSTOLIC BLOOD PRESSURE - MUSE: NORMAL MMHG
T AXIS - MUSE: 15 DEGREES
TROPONIN I SERPL HS-MCNC: 6 NG/L
TROPONIN I SERPL HS-MCNC: 6 NG/L
TROPONIN T SERPL HS-MCNC: 23 NG/L
TROPONIN T SERPL HS-MCNC: 25 NG/L
UROBILINOGEN UR STRIP-ACNC: 0.2 E.U./DL
UROBILINOGEN UR STRIP-MCNC: NORMAL MG/DL
VENTRICULAR RATE- MUSE: 83 BPM
WBC # BLD AUTO: 11.5 10E3/UL (ref 4–11)
WBC # BLD AUTO: 11.8 10E3/UL (ref 4–11)
WBC # BLD AUTO: 13.9 10E3/UL (ref 4–11)
WBC # BLD AUTO: 5.6 10E3/UL (ref 4–11)
WBC # BLD AUTO: 5.8 10E3/UL (ref 4–11)
WBC # BLD AUTO: 6.3 10E3/UL (ref 4–11)
WBC # BLD AUTO: 6.7 10E3/UL (ref 4–11)
WBC # BLD AUTO: 7.2 10E3/UL (ref 4–11)
WBC # BLD AUTO: 7.5 10E3/UL (ref 4–11)
WBC # BLD AUTO: 8.3 10E3/UL (ref 4–11)
WBC #/AREA URNS AUTO: >100 /HPF
WBC #/AREA URNS AUTO: ABNORMAL /HPF
WBC #/AREA URNS AUTO: ABNORMAL /HPF
WBC CLUMPS #/AREA URNS HPF: PRESENT /HPF
WBC CLUMPS #/AREA URNS HPF: PRESENT /HPF
WBC URINE: >182 /HPF
YEAST #/AREA URNS HPF: ABNORMAL /HPF

## 2022-01-01 PROCEDURE — 99204 OFFICE O/P NEW MOD 45 MIN: CPT | Mod: 95 | Performed by: PHYSICIAN ASSISTANT

## 2022-01-01 PROCEDURE — 250N000013 HC RX MED GY IP 250 OP 250 PS 637: Performed by: STUDENT IN AN ORGANIZED HEALTH CARE EDUCATION/TRAINING PROGRAM

## 2022-01-01 PROCEDURE — 99213 OFFICE O/P EST LOW 20 MIN: CPT | Performed by: PHYSICIAN ASSISTANT

## 2022-01-01 PROCEDURE — 99214 OFFICE O/P EST MOD 30 MIN: CPT | Performed by: NURSE PRACTITIONER

## 2022-01-01 PROCEDURE — 87086 URINE CULTURE/COLONY COUNT: CPT | Performed by: UROLOGY

## 2022-01-01 PROCEDURE — 71045 X-RAY EXAM CHEST 1 VIEW: CPT

## 2022-01-01 PROCEDURE — 36415 COLL VENOUS BLD VENIPUNCTURE: CPT | Performed by: STUDENT IN AN ORGANIZED HEALTH CARE EDUCATION/TRAINING PROGRAM

## 2022-01-01 PROCEDURE — 71046 X-RAY EXAM CHEST 2 VIEWS: CPT

## 2022-01-01 PROCEDURE — 80053 COMPREHEN METABOLIC PANEL: CPT | Performed by: EMERGENCY MEDICINE

## 2022-01-01 PROCEDURE — G1004 CDSM NDSC: HCPCS

## 2022-01-01 PROCEDURE — 80048 BASIC METABOLIC PNL TOTAL CA: CPT | Performed by: PHYSICIAN ASSISTANT

## 2022-01-01 PROCEDURE — 87086 URINE CULTURE/COLONY COUNT: CPT | Performed by: FAMILY MEDICINE

## 2022-01-01 PROCEDURE — 258N000003 HC RX IP 258 OP 636: Performed by: EMERGENCY MEDICINE

## 2022-01-01 PROCEDURE — 999N000202 HC STATISTICAL VASC ACCESS NURSE TIME, 1-15 MINUTES

## 2022-01-01 PROCEDURE — 120N000011 HC R&B TRANSPLANT UMMC

## 2022-01-01 PROCEDURE — 81001 URINALYSIS AUTO W/SCOPE: CPT | Performed by: FAMILY MEDICINE

## 2022-01-01 PROCEDURE — 250N000013 HC RX MED GY IP 250 OP 250 PS 637

## 2022-01-01 PROCEDURE — G1010 CDSM STANSON: HCPCS | Mod: GC | Performed by: RADIOLOGY

## 2022-01-01 PROCEDURE — 81001 URINALYSIS AUTO W/SCOPE: CPT | Performed by: EMERGENCY MEDICINE

## 2022-01-01 PROCEDURE — 85027 COMPLETE CBC AUTOMATED: CPT | Performed by: STUDENT IN AN ORGANIZED HEALTH CARE EDUCATION/TRAINING PROGRAM

## 2022-01-01 PROCEDURE — 85014 HEMATOCRIT: CPT | Performed by: STUDENT IN AN ORGANIZED HEALTH CARE EDUCATION/TRAINING PROGRAM

## 2022-01-01 PROCEDURE — 85014 HEMATOCRIT: CPT

## 2022-01-01 PROCEDURE — 99232 SBSQ HOSP IP/OBS MODERATE 35: CPT | Performed by: STUDENT IN AN ORGANIZED HEALTH CARE EDUCATION/TRAINING PROGRAM

## 2022-01-01 PROCEDURE — 85610 PROTHROMBIN TIME: CPT | Performed by: STUDENT IN AN ORGANIZED HEALTH CARE EDUCATION/TRAINING PROGRAM

## 2022-01-01 PROCEDURE — 83735 ASSAY OF MAGNESIUM: CPT | Performed by: EMERGENCY MEDICINE

## 2022-01-01 PROCEDURE — 258N000003 HC RX IP 258 OP 636: Performed by: STUDENT IN AN ORGANIZED HEALTH CARE EDUCATION/TRAINING PROGRAM

## 2022-01-01 PROCEDURE — 70450 CT HEAD/BRAIN W/O DYE: CPT | Mod: 26 | Performed by: RADIOLOGY

## 2022-01-01 PROCEDURE — C9803 HOPD COVID-19 SPEC COLLECT: HCPCS | Performed by: EMERGENCY MEDICINE

## 2022-01-01 PROCEDURE — 999N000248 HC STATISTIC IV INSERT WITH US BY RN

## 2022-01-01 PROCEDURE — 85025 COMPLETE CBC W/AUTO DIFF WBC: CPT | Performed by: EMERGENCY MEDICINE

## 2022-01-01 PROCEDURE — 84484 ASSAY OF TROPONIN QUANT: CPT | Performed by: EMERGENCY MEDICINE

## 2022-01-01 PROCEDURE — 97161 PT EVAL LOW COMPLEX 20 MIN: CPT | Mod: GP

## 2022-01-01 PROCEDURE — 250N000011 HC RX IP 250 OP 636

## 2022-01-01 PROCEDURE — 86140 C-REACTIVE PROTEIN: CPT | Performed by: EMERGENCY MEDICINE

## 2022-01-01 PROCEDURE — 36415 COLL VENOUS BLD VENIPUNCTURE: CPT | Performed by: EMERGENCY MEDICINE

## 2022-01-01 PROCEDURE — 81001 URINALYSIS AUTO W/SCOPE: CPT | Performed by: PHYSICIAN ASSISTANT

## 2022-01-01 PROCEDURE — 93005 ELECTROCARDIOGRAM TRACING: CPT | Performed by: EMERGENCY MEDICINE

## 2022-01-01 PROCEDURE — 250N000013 HC RX MED GY IP 250 OP 250 PS 637: Performed by: EMERGENCY MEDICINE

## 2022-01-01 PROCEDURE — 99285 EMERGENCY DEPT VISIT HI MDM: CPT | Mod: 25 | Performed by: EMERGENCY MEDICINE

## 2022-01-01 PROCEDURE — 81003 URINALYSIS AUTO W/O SCOPE: CPT | Mod: QW | Performed by: UROLOGY

## 2022-01-01 PROCEDURE — 90662 IIV NO PRSV INCREASED AG IM: CPT | Performed by: STUDENT IN AN ORGANIZED HEALTH CARE EDUCATION/TRAINING PROGRAM

## 2022-01-01 PROCEDURE — 86140 C-REACTIVE PROTEIN: CPT | Performed by: STUDENT IN AN ORGANIZED HEALTH CARE EDUCATION/TRAINING PROGRAM

## 2022-01-01 PROCEDURE — 80048 BASIC METABOLIC PNL TOTAL CA: CPT | Performed by: STUDENT IN AN ORGANIZED HEALTH CARE EDUCATION/TRAINING PROGRAM

## 2022-01-01 PROCEDURE — 82040 ASSAY OF SERUM ALBUMIN: CPT | Performed by: STUDENT IN AN ORGANIZED HEALTH CARE EDUCATION/TRAINING PROGRAM

## 2022-01-01 PROCEDURE — 999N000285 HC STATISTIC VASC ACCESS LAB DRAW WITH PIV START

## 2022-01-01 PROCEDURE — 87086 URINE CULTURE/COLONY COUNT: CPT | Performed by: PHYSICIAN ASSISTANT

## 2022-01-01 PROCEDURE — 99285 EMERGENCY DEPT VISIT HI MDM: CPT | Mod: 25

## 2022-01-01 PROCEDURE — 96375 TX/PRO/DX INJ NEW DRUG ADDON: CPT | Performed by: EMERGENCY MEDICINE

## 2022-01-01 PROCEDURE — 82310 ASSAY OF CALCIUM: CPT | Performed by: STUDENT IN AN ORGANIZED HEALTH CARE EDUCATION/TRAINING PROGRAM

## 2022-01-01 PROCEDURE — 36415 COLL VENOUS BLD VENIPUNCTURE: CPT | Performed by: PHYSICIAN ASSISTANT

## 2022-01-01 PROCEDURE — 99238 HOSP IP/OBS DSCHRG MGMT 30/<: CPT | Performed by: STUDENT IN AN ORGANIZED HEALTH CARE EDUCATION/TRAINING PROGRAM

## 2022-01-01 PROCEDURE — G1010 CDSM STANSON: HCPCS | Performed by: RADIOLOGY

## 2022-01-01 PROCEDURE — 87086 URINE CULTURE/COLONY COUNT: CPT | Performed by: NURSE PRACTITIONER

## 2022-01-01 PROCEDURE — 97530 THERAPEUTIC ACTIVITIES: CPT | Mod: GP

## 2022-01-01 PROCEDURE — 80053 COMPREHEN METABOLIC PANEL: CPT | Performed by: STUDENT IN AN ORGANIZED HEALTH CARE EDUCATION/TRAINING PROGRAM

## 2022-01-01 PROCEDURE — U0005 INFEC AGEN DETEC AMPLI PROBE: HCPCS | Performed by: EMERGENCY MEDICINE

## 2022-01-01 PROCEDURE — 85018 HEMOGLOBIN: CPT | Performed by: STUDENT IN AN ORGANIZED HEALTH CARE EDUCATION/TRAINING PROGRAM

## 2022-01-01 PROCEDURE — 96365 THER/PROPH/DIAG IV INF INIT: CPT | Mod: 59 | Performed by: EMERGENCY MEDICINE

## 2022-01-01 PROCEDURE — 74177 CT ABD & PELVIS W/CONTRAST: CPT | Mod: MG

## 2022-01-01 PROCEDURE — 76770 US EXAM ABDO BACK WALL COMP: CPT | Mod: 26 | Performed by: RADIOLOGY

## 2022-01-01 PROCEDURE — 999N000127 HC STATISTIC PERIPHERAL IV START W US GUIDANCE

## 2022-01-01 PROCEDURE — 85379 FIBRIN DEGRADATION QUANT: CPT | Performed by: EMERGENCY MEDICINE

## 2022-01-01 PROCEDURE — 99213 OFFICE O/P EST LOW 20 MIN: CPT

## 2022-01-01 PROCEDURE — 96361 HYDRATE IV INFUSION ADD-ON: CPT | Performed by: EMERGENCY MEDICINE

## 2022-01-01 PROCEDURE — 83605 ASSAY OF LACTIC ACID: CPT | Performed by: EMERGENCY MEDICINE

## 2022-01-01 PROCEDURE — 83735 ASSAY OF MAGNESIUM: CPT

## 2022-01-01 PROCEDURE — 93005 ELECTROCARDIOGRAM TRACING: CPT

## 2022-01-01 PROCEDURE — 83690 ASSAY OF LIPASE: CPT | Performed by: EMERGENCY MEDICINE

## 2022-01-01 PROCEDURE — 93010 ELECTROCARDIOGRAM REPORT: CPT | Performed by: EMERGENCY MEDICINE

## 2022-01-01 PROCEDURE — 87109 MYCOPLASMA: CPT | Performed by: PHYSICIAN ASSISTANT

## 2022-01-01 PROCEDURE — 250N000011 HC RX IP 250 OP 636: Performed by: HOSPITALIST

## 2022-01-01 PROCEDURE — 250N000011 HC RX IP 250 OP 636: Performed by: EMERGENCY MEDICINE

## 2022-01-01 PROCEDURE — 87040 BLOOD CULTURE FOR BACTERIA: CPT | Performed by: EMERGENCY MEDICINE

## 2022-01-01 PROCEDURE — 250N000011 HC RX IP 250 OP 636: Performed by: STUDENT IN AN ORGANIZED HEALTH CARE EDUCATION/TRAINING PROGRAM

## 2022-01-01 PROCEDURE — G1010 CDSM STANSON: HCPCS

## 2022-01-01 PROCEDURE — 87088 URINE BACTERIA CULTURE: CPT | Performed by: FAMILY MEDICINE

## 2022-01-01 PROCEDURE — 81001 URINALYSIS AUTO W/SCOPE: CPT | Performed by: NURSE PRACTITIONER

## 2022-01-01 PROCEDURE — 99213 OFFICE O/P EST LOW 20 MIN: CPT | Performed by: FAMILY MEDICINE

## 2022-01-01 PROCEDURE — 250N000011 HC RX IP 250 OP 636: Performed by: PHYSICIAN ASSISTANT

## 2022-01-01 PROCEDURE — G0463 HOSPITAL OUTPT CLINIC VISIT: HCPCS

## 2022-01-01 PROCEDURE — G0008 ADMIN INFLUENZA VIRUS VAC: HCPCS | Performed by: STUDENT IN AN ORGANIZED HEALTH CARE EDUCATION/TRAINING PROGRAM

## 2022-01-01 PROCEDURE — 76770 US EXAM ABDO BACK WALL COMP: CPT

## 2022-01-01 PROCEDURE — 71045 X-RAY EXAM CHEST 1 VIEW: CPT | Mod: 26 | Performed by: RADIOLOGY

## 2022-01-01 PROCEDURE — 81001 URINALYSIS AUTO W/SCOPE: CPT

## 2022-01-01 PROCEDURE — 83735 ASSAY OF MAGNESIUM: CPT | Performed by: STUDENT IN AN ORGANIZED HEALTH CARE EDUCATION/TRAINING PROGRAM

## 2022-01-01 PROCEDURE — 87086 URINE CULTURE/COLONY COUNT: CPT | Performed by: EMERGENCY MEDICINE

## 2022-01-01 PROCEDURE — 99222 1ST HOSP IP/OBS MODERATE 55: CPT | Performed by: PHYSICIAN ASSISTANT

## 2022-01-01 PROCEDURE — 99214 OFFICE O/P EST MOD 30 MIN: CPT | Performed by: INTERNAL MEDICINE

## 2022-01-01 PROCEDURE — 74177 CT ABD & PELVIS W/CONTRAST: CPT | Mod: 26 | Performed by: RADIOLOGY

## 2022-01-01 PROCEDURE — 258N000003 HC RX IP 258 OP 636

## 2022-01-01 PROCEDURE — 97116 GAIT TRAINING THERAPY: CPT | Mod: GP | Performed by: REHABILITATION PRACTITIONER

## 2022-01-01 PROCEDURE — 84450 TRANSFERASE (AST) (SGOT): CPT | Performed by: STUDENT IN AN ORGANIZED HEALTH CARE EDUCATION/TRAINING PROGRAM

## 2022-01-01 PROCEDURE — 88112 CYTOPATH CELL ENHANCE TECH: CPT | Performed by: PATHOLOGY

## 2022-01-01 PROCEDURE — 80048 BASIC METABOLIC PNL TOTAL CA: CPT

## 2022-01-01 PROCEDURE — 87086 URINE CULTURE/COLONY COUNT: CPT

## 2022-01-01 PROCEDURE — 999N000111 HC STATISTIC OT IP EVAL DEFER: Performed by: OCCUPATIONAL THERAPIST

## 2022-01-01 PROCEDURE — 99214 OFFICE O/P EST MOD 30 MIN: CPT | Performed by: UROLOGY

## 2022-01-01 PROCEDURE — 84484 ASSAY OF TROPONIN QUANT: CPT | Mod: 91 | Performed by: EMERGENCY MEDICINE

## 2022-01-01 PROCEDURE — 97530 THERAPEUTIC ACTIVITIES: CPT | Mod: GP | Performed by: REHABILITATION PRACTITIONER

## 2022-01-01 PROCEDURE — 99214 OFFICE O/P EST MOD 30 MIN: CPT | Performed by: PHYSICIAN ASSISTANT

## 2022-01-01 PROCEDURE — 36415 COLL VENOUS BLD VENIPUNCTURE: CPT

## 2022-01-01 PROCEDURE — 87186 SC STD MICRODIL/AGAR DIL: CPT | Performed by: FAMILY MEDICINE

## 2022-01-01 RX ORDER — CEFDINIR 300 MG/1
300 CAPSULE ORAL 2 TIMES DAILY
Qty: 10 CAPSULE | Refills: 0 | Status: SHIPPED | OUTPATIENT
Start: 2022-01-01 | End: 2022-01-01

## 2022-01-01 RX ORDER — AMOXICILLIN 250 MG
2 CAPSULE ORAL 2 TIMES DAILY PRN
Status: DISCONTINUED | OUTPATIENT
Start: 2022-01-01 | End: 2022-01-01 | Stop reason: HOSPADM

## 2022-01-01 RX ORDER — FLUTICASONE PROPIONATE 50 MCG
2 SPRAY, SUSPENSION (ML) NASAL DAILY
COMMUNITY

## 2022-01-01 RX ORDER — HALOPERIDOL 5 MG/ML
2 INJECTION INTRAMUSCULAR
Status: COMPLETED | OUTPATIENT
Start: 2022-01-01 | End: 2022-01-01

## 2022-01-01 RX ORDER — CEPHALEXIN 500 MG/1
500 CAPSULE ORAL 3 TIMES DAILY
Qty: 21 CAPSULE | Refills: 0 | Status: SHIPPED | OUTPATIENT
Start: 2022-01-01 | End: 2022-01-01

## 2022-01-01 RX ORDER — IOPAMIDOL 755 MG/ML
90 INJECTION, SOLUTION INTRAVASCULAR ONCE
Status: COMPLETED | OUTPATIENT
Start: 2022-01-01 | End: 2022-01-01

## 2022-01-01 RX ORDER — QUETIAPINE FUMARATE 25 MG/1
12.5 TABLET, FILM COATED ORAL 3 TIMES DAILY PRN
Qty: 90 TABLET | Refills: 1 | Status: SHIPPED | OUTPATIENT
Start: 2022-01-01

## 2022-01-01 RX ORDER — FLUTICASONE PROPIONATE 50 MCG
2 SPRAY, SUSPENSION (ML) NASAL DAILY
Status: DISCONTINUED | OUTPATIENT
Start: 2022-01-01 | End: 2022-01-01 | Stop reason: HOSPADM

## 2022-01-01 RX ORDER — MONTELUKAST SODIUM 10 MG/1
TABLET ORAL
Qty: 90 TABLET | Refills: 2 | Status: SHIPPED | OUTPATIENT
Start: 2022-01-01 | End: 2022-01-01

## 2022-01-01 RX ORDER — LIDOCAINE HYDROCHLORIDE 20 MG/ML
JELLY TOPICAL ONCE
Status: DISCONTINUED | OUTPATIENT
Start: 2022-01-01 | End: 2022-01-01 | Stop reason: HOSPADM

## 2022-01-01 RX ORDER — ENOXAPARIN SODIUM 100 MG/ML
40 INJECTION SUBCUTANEOUS EVERY 24 HOURS
Status: DISCONTINUED | OUTPATIENT
Start: 2022-01-01 | End: 2022-01-01 | Stop reason: HOSPADM

## 2022-01-01 RX ORDER — ACETAMINOPHEN 500 MG
1000 TABLET ORAL ONCE
Status: COMPLETED | OUTPATIENT
Start: 2022-01-01 | End: 2022-01-01

## 2022-01-01 RX ORDER — LOSARTAN POTASSIUM 100 MG/1
100 TABLET ORAL DAILY
Status: DISCONTINUED | OUTPATIENT
Start: 2022-01-01 | End: 2022-01-01 | Stop reason: HOSPADM

## 2022-01-01 RX ORDER — SULFAMETHOXAZOLE/TRIMETHOPRIM 800-160 MG
1 TABLET ORAL 2 TIMES DAILY
Qty: 10 TABLET | Refills: 0 | Status: SHIPPED | OUTPATIENT
Start: 2022-01-01 | End: 2022-01-01

## 2022-01-01 RX ORDER — AMOXICILLIN 250 MG
1 CAPSULE ORAL 2 TIMES DAILY PRN
Status: DISCONTINUED | OUTPATIENT
Start: 2022-01-01 | End: 2022-01-01 | Stop reason: HOSPADM

## 2022-01-01 RX ORDER — POLYETHYLENE GLYCOL 3350 17 G/17G
17 POWDER, FOR SOLUTION ORAL DAILY PRN
Status: DISCONTINUED | OUTPATIENT
Start: 2022-01-01 | End: 2022-01-01 | Stop reason: HOSPADM

## 2022-01-01 RX ORDER — LIDOCAINE 40 MG/G
CREAM TOPICAL
Status: DISCONTINUED | OUTPATIENT
Start: 2022-01-01 | End: 2022-01-01 | Stop reason: HOSPADM

## 2022-01-01 RX ORDER — QUETIAPINE FUMARATE 25 MG/1
25 TABLET, FILM COATED ORAL 2 TIMES DAILY PRN
Status: DISCONTINUED | OUTPATIENT
Start: 2022-01-01 | End: 2022-01-01 | Stop reason: HOSPADM

## 2022-01-01 RX ORDER — CEFTRIAXONE 1 G/1
1 INJECTION, POWDER, FOR SOLUTION INTRAMUSCULAR; INTRAVENOUS EVERY 24 HOURS
Status: DISCONTINUED | OUTPATIENT
Start: 2022-01-01 | End: 2022-01-01

## 2022-01-01 RX ORDER — LOSARTAN POTASSIUM 100 MG/1
TABLET ORAL
Qty: 90 TABLET | Refills: 2 | Status: SHIPPED | OUTPATIENT
Start: 2022-01-01 | End: 2022-01-01

## 2022-01-01 RX ORDER — SODIUM CHLORIDE 9 MG/ML
INJECTION, SOLUTION INTRAVENOUS CONTINUOUS
Status: ACTIVE | OUTPATIENT
Start: 2022-01-01 | End: 2022-01-01

## 2022-01-01 RX ORDER — LOSARTAN POTASSIUM 100 MG/1
50 TABLET ORAL DAILY
Qty: 60 TABLET | Refills: 2 | Status: SHIPPED | OUTPATIENT
Start: 2022-01-01

## 2022-01-01 RX ORDER — SODIUM CHLORIDE, SODIUM LACTATE, POTASSIUM CHLORIDE, CALCIUM CHLORIDE 600; 310; 30; 20 MG/100ML; MG/100ML; MG/100ML; MG/100ML
INJECTION, SOLUTION INTRAVENOUS CONTINUOUS
Status: DISCONTINUED | OUTPATIENT
Start: 2022-01-01 | End: 2022-01-01 | Stop reason: HOSPADM

## 2022-01-01 RX ORDER — FLUOXETINE 10 MG/1
CAPSULE ORAL
Qty: 90 CAPSULE | Refills: 2 | Status: SHIPPED | OUTPATIENT
Start: 2022-01-01 | End: 2022-01-01

## 2022-01-01 RX ORDER — SODIUM CHLORIDE 9 MG/ML
INJECTION, SOLUTION INTRAVENOUS CONTINUOUS
Status: DISCONTINUED | OUTPATIENT
Start: 2022-01-01 | End: 2022-01-01

## 2022-01-01 RX ORDER — FLUTICASONE PROPIONATE 50 MCG
2 SPRAY, SUSPENSION (ML) NASAL DAILY
Qty: 16 G | Refills: 3 | Status: SHIPPED | OUTPATIENT
Start: 2022-01-01 | End: 2022-01-01

## 2022-01-01 RX ORDER — CEFTRIAXONE 1 G/1
1 INJECTION, POWDER, FOR SOLUTION INTRAMUSCULAR; INTRAVENOUS ONCE
Status: COMPLETED | OUTPATIENT
Start: 2022-01-01 | End: 2022-01-01

## 2022-01-01 RX ORDER — MAGNESIUM SULFATE HEPTAHYDRATE 40 MG/ML
4 INJECTION, SOLUTION INTRAVENOUS ONCE
Status: COMPLETED | OUTPATIENT
Start: 2022-01-01 | End: 2022-01-01

## 2022-01-01 RX ORDER — ESTRADIOL 0.1 MG/G
CREAM VAGINAL
Qty: 42.5 G | Refills: 3 | Status: SHIPPED | OUTPATIENT
Start: 2022-01-01

## 2022-01-01 RX ORDER — FLUCONAZOLE 150 MG/1
150 TABLET ORAL ONCE
Qty: 1 TABLET | Refills: 0 | Status: SHIPPED | OUTPATIENT
Start: 2022-01-01 | End: 2022-01-01

## 2022-01-01 RX ORDER — MONTELUKAST SODIUM 10 MG/1
10 TABLET ORAL AT BEDTIME
Qty: 90 TABLET | Refills: 3 | Status: SHIPPED | OUTPATIENT
Start: 2022-01-01 | End: 2022-01-01

## 2022-01-01 RX ORDER — IOPAMIDOL 755 MG/ML
100 INJECTION, SOLUTION INTRAVASCULAR ONCE
Status: COMPLETED | OUTPATIENT
Start: 2022-01-01 | End: 2022-01-01

## 2022-01-01 RX ORDER — LOSARTAN POTASSIUM 100 MG/1
TABLET ORAL
Qty: 90 TABLET | Refills: 3 | OUTPATIENT
Start: 2022-01-01

## 2022-01-01 RX ORDER — CIPROFLOXACIN 500 MG/1
500 TABLET, FILM COATED ORAL 2 TIMES DAILY
Qty: 10 TABLET | Refills: 0 | Status: SHIPPED | OUTPATIENT
Start: 2022-01-01 | End: 2022-01-01

## 2022-01-01 RX ORDER — ACETAMINOPHEN 500 MG
500-1000 TABLET ORAL EVERY 6 HOURS PRN
COMMUNITY

## 2022-01-01 RX ADMIN — ACETAMINOPHEN 1000 MG: 500 TABLET, FILM COATED ORAL at 17:13

## 2022-01-01 RX ADMIN — FLUOXETINE 20 MG: 20 CAPSULE ORAL at 09:19

## 2022-01-01 RX ADMIN — ENOXAPARIN SODIUM 40 MG: 40 INJECTION SUBCUTANEOUS at 19:46

## 2022-01-01 RX ADMIN — FLUOXETINE 20 MG: 20 CAPSULE ORAL at 09:53

## 2022-01-01 RX ADMIN — ENOXAPARIN SODIUM 40 MG: 40 INJECTION SUBCUTANEOUS at 21:31

## 2022-01-01 RX ADMIN — FLUTICASONE PROPIONATE 2 SPRAY: 50 SPRAY, METERED NASAL at 11:06

## 2022-01-01 RX ADMIN — SODIUM CHLORIDE, POTASSIUM CHLORIDE, SODIUM LACTATE AND CALCIUM CHLORIDE: 600; 310; 30; 20 INJECTION, SOLUTION INTRAVENOUS at 23:11

## 2022-01-01 RX ADMIN — AMOXICILLIN AND CLAVULANATE POTASSIUM 1 TABLET: 875; 125 TABLET, FILM COATED ORAL at 20:17

## 2022-01-01 RX ADMIN — FLUTICASONE PROPIONATE 2 SPRAY: 50 SPRAY, METERED NASAL at 08:46

## 2022-01-01 RX ADMIN — CEFTRIAXONE SODIUM 1 G: 1 INJECTION, POWDER, FOR SOLUTION INTRAMUSCULAR; INTRAVENOUS at 22:03

## 2022-01-01 RX ADMIN — ENOXAPARIN SODIUM 40 MG: 40 INJECTION SUBCUTANEOUS at 19:57

## 2022-01-01 RX ADMIN — IOPAMIDOL 100 ML: 755 INJECTION, SOLUTION INTRAVENOUS at 10:03

## 2022-01-01 RX ADMIN — HALOPERIDOL LACTATE 2 MG: 5 INJECTION, SOLUTION INTRAMUSCULAR at 02:30

## 2022-01-01 RX ADMIN — FLUTICASONE PROPIONATE 2 SPRAY: 50 SPRAY, METERED NASAL at 08:52

## 2022-01-01 RX ADMIN — FLUTICASONE PROPIONATE 2 SPRAY: 50 SPRAY, METERED NASAL at 09:20

## 2022-01-01 RX ADMIN — SODIUM CHLORIDE 1000 ML: 9 INJECTION, SOLUTION INTRAVENOUS at 21:11

## 2022-01-01 RX ADMIN — ENOXAPARIN SODIUM 40 MG: 40 INJECTION SUBCUTANEOUS at 19:52

## 2022-01-01 RX ADMIN — POLYETHYLENE GLYCOL 3350 17 G: 17 POWDER, FOR SOLUTION ORAL at 15:44

## 2022-01-01 RX ADMIN — SODIUM CHLORIDE: 9 INJECTION, SOLUTION INTRAVENOUS at 23:17

## 2022-01-01 RX ADMIN — AMOXICILLIN AND CLAVULANATE POTASSIUM 1 TABLET: 875; 125 TABLET, FILM COATED ORAL at 08:52

## 2022-01-01 RX ADMIN — CEFTRIAXONE 1 G: 1 INJECTION, POWDER, FOR SOLUTION INTRAMUSCULAR; INTRAVENOUS at 17:45

## 2022-01-01 RX ADMIN — CEFTRIAXONE SODIUM 1 G: 1 INJECTION, POWDER, FOR SOLUTION INTRAMUSCULAR; INTRAVENOUS at 22:06

## 2022-01-01 RX ADMIN — AMOXICILLIN AND CLAVULANATE POTASSIUM 1 TABLET: 875; 125 TABLET, FILM COATED ORAL at 19:43

## 2022-01-01 RX ADMIN — FLUOXETINE 20 MG: 20 CAPSULE ORAL at 09:41

## 2022-01-01 RX ADMIN — MAGNESIUM SULFATE HEPTAHYDRATE 4 G: 40 INJECTION, SOLUTION INTRAVENOUS at 18:53

## 2022-01-01 RX ADMIN — FLUTICASONE PROPIONATE 2 SPRAY: 50 SPRAY, METERED NASAL at 08:34

## 2022-01-01 RX ADMIN — FLUOXETINE 20 MG: 20 CAPSULE ORAL at 09:01

## 2022-01-01 RX ADMIN — FLUTICASONE PROPIONATE 2 SPRAY: 50 SPRAY, METERED NASAL at 09:25

## 2022-01-01 RX ADMIN — ENOXAPARIN SODIUM 40 MG: 40 INJECTION SUBCUTANEOUS at 20:17

## 2022-01-01 RX ADMIN — AMOXICILLIN AND CLAVULANATE POTASSIUM 1 TABLET: 875; 125 TABLET, FILM COATED ORAL at 21:30

## 2022-01-01 RX ADMIN — AMOXICILLIN AND CLAVULANATE POTASSIUM 1 TABLET: 875; 125 TABLET, FILM COATED ORAL at 08:46

## 2022-01-01 RX ADMIN — ENOXAPARIN SODIUM 40 MG: 40 INJECTION SUBCUTANEOUS at 20:48

## 2022-01-01 RX ADMIN — Medication 1 MG: at 19:43

## 2022-01-01 RX ADMIN — FLUOXETINE 20 MG: 20 CAPSULE ORAL at 08:52

## 2022-01-01 RX ADMIN — SODIUM CHLORIDE, POTASSIUM CHLORIDE, SODIUM LACTATE AND CALCIUM CHLORIDE: 600; 310; 30; 20 INJECTION, SOLUTION INTRAVENOUS at 13:05

## 2022-01-01 RX ADMIN — IOPAMIDOL 90 ML: 755 INJECTION, SOLUTION INTRAVENOUS at 18:19

## 2022-01-01 RX ADMIN — FLUOXETINE 20 MG: 20 CAPSULE ORAL at 08:46

## 2022-01-01 RX ADMIN — FLUOXETINE 20 MG: 20 CAPSULE ORAL at 09:25

## 2022-01-01 RX ADMIN — INFLUENZA A VIRUS A/VICTORIA/2570/2019 IVR-215 (H1N1) ANTIGEN (FORMALDEHYDE INACTIVATED), INFLUENZA A VIRUS A/DARWIN/9/2021 SAN-010 (H3N2) ANTIGEN (FORMALDEHYDE INACTIVATED), INFLUENZA B VIRUS B/PHUKET/3073/2013 ANTIGEN (FORMALDEHYDE INACTIVATED), AND INFLUENZA B VIRUS B/MICHIGAN/01/2021 ANTIGEN (FORMALDEHYDE INACTIVATED) 0.7 ML: 60; 60; 60; 60 INJECTION, SUSPENSION INTRAMUSCULAR at 11:15

## 2022-01-01 RX ADMIN — AMOXICILLIN AND CLAVULANATE POTASSIUM 1 TABLET: 875; 125 TABLET, FILM COATED ORAL at 09:25

## 2022-01-01 ASSESSMENT — ACTIVITIES OF DAILY LIVING (ADL)
ADLS_ACUITY_SCORE: 57
ADLS_ACUITY_SCORE: 58
ADLS_ACUITY_SCORE: 43
DRESSING/BATHING: BATHING DIFFICULTY, ASSISTANCE 1 PERSON
ADLS_ACUITY_SCORE: 57
ADLS_ACUITY_SCORE: 35
DIFFICULTY_COMMUNICATING: NO
ADLS_ACUITY_SCORE: 35
ADLS_ACUITY_SCORE: 57
ADLS_ACUITY_SCORE: 57
ADLS_ACUITY_SCORE: 53
DIFFICULTY_EATING/SWALLOWING: NO
TOILETING_ISSUES: YES
ADLS_ACUITY_SCORE: 53
ADLS_ACUITY_SCORE: 53
ADLS_ACUITY_SCORE: 57
ADLS_ACUITY_SCORE: 53
TRANSFERRING: 2-->COMPLETELY DEPENDENT (NOT DEVELOPMENTALLY APPROPRIATE)
ADLS_ACUITY_SCORE: 57
ADLS_ACUITY_SCORE: 57
DRESS: 2-->COMPLETELY DEPENDENT (NOT DEVELOPMENTALLY APPROPRIATE)
ADLS_ACUITY_SCORE: 53
ADLS_ACUITY_SCORE: 57
NUMBER_OF_TIMES_PATIENT_HAS_FALLEN_WITHIN_LAST_SIX_MONTHS: 8
ADLS_ACUITY_SCORE: 57
ADLS_ACUITY_SCORE: 57
ADLS_ACUITY_SCORE: 53
CHANGE_IN_FUNCTIONAL_STATUS_SINCE_ONSET_OF_CURRENT_ILLNESS/INJURY: YES
ADLS_ACUITY_SCORE: 57
FALL_HISTORY_WITHIN_LAST_SIX_MONTHS: YES
ADLS_ACUITY_SCORE: 57
ADLS_ACUITY_SCORE: 57
WALKING_OR_CLIMBING_STAIRS_DIFFICULTY: YES
DRESSING/BATHING_DIFFICULTY: YES
ADLS_ACUITY_SCORE: 57
HEARING_DIFFICULTY_OR_DEAF: NO
ADLS_ACUITY_SCORE: 35
ADLS_ACUITY_SCORE: 57
DRESS: 2-->COMPLETELY DEPENDENT
ADLS_ACUITY_SCORE: 43
ADLS_ACUITY_SCORE: 35
ADLS_ACUITY_SCORE: 53
ADLS_ACUITY_SCORE: 57
ADLS_ACUITY_SCORE: 35
ADLS_ACUITY_SCORE: 35
ADLS_ACUITY_SCORE: 57
ADLS_ACUITY_SCORE: 43
ADLS_ACUITY_SCORE: 57
ADLS_ACUITY_SCORE: 35
TOILETING_ASSISTANCE: TOILETING DIFFICULTY, DEPENDENT
ADLS_ACUITY_SCORE: 35
ADLS_ACUITY_SCORE: 57
ADLS_ACUITY_SCORE: 57
ADLS_ACUITY_SCORE: 35
ADLS_ACUITY_SCORE: 57
ADLS_ACUITY_SCORE: 35
ADLS_ACUITY_SCORE: 57
ADLS_ACUITY_SCORE: 35
ADLS_ACUITY_SCORE: 57
ADLS_ACUITY_SCORE: 57
ADLS_ACUITY_SCORE: 53
ADLS_ACUITY_SCORE: 57
ADLS_ACUITY_SCORE: 35
ADLS_ACUITY_SCORE: 57
ADLS_ACUITY_SCORE: 57
TOILETING: 2-->COMPLETELY DEPENDENT
ADLS_ACUITY_SCORE: 53
ADLS_ACUITY_SCORE: 57
ADLS_ACUITY_SCORE: 35
CONCENTRATING,_REMEMBERING_OR_MAKING_DECISIONS_DIFFICULTY: YES
ADLS_ACUITY_SCORE: 57
ADLS_ACUITY_SCORE: 53
BATHING: 2-->COMPLETELY DEPENDENT (NOT DEVELOPMENTALLY APPROPRIATE)
ADLS_ACUITY_SCORE: 57
ADLS_ACUITY_SCORE: 53
ADLS_ACUITY_SCORE: 57
ADLS_ACUITY_SCORE: 57
VISION_MANAGEMENT: EYE GLASSES
ADLS_ACUITY_SCORE: 57
ADLS_ACUITY_SCORE: 35
ADLS_ACUITY_SCORE: 53
ADLS_ACUITY_SCORE: 53
ADLS_ACUITY_SCORE: 57
ADLS_ACUITY_SCORE: 57
ADLS_ACUITY_SCORE: 43
DEPENDENT_IADLS:: CLEANING;COOKING;LAUNDRY;SHOPPING;MEAL PREPARATION;MEDICATION MANAGEMENT;MONEY MANAGEMENT;TRANSPORTATION;INCONTINENCE
ADLS_ACUITY_SCORE: 35
ADLS_ACUITY_SCORE: 53
ADLS_ACUITY_SCORE: 43
ADLS_ACUITY_SCORE: 57
DOING_ERRANDS_INDEPENDENTLY_DIFFICULTY: NO
TOILETING: 2-->COMPLETELY DEPENDENT (NOT DEVELOPMENTALLY APPROPRIATE)
ADLS_ACUITY_SCORE: 57
WEAR_GLASSES_OR_BLIND: YES
ADLS_ACUITY_SCORE: 57
TRANSFERRING: 2-->COMPLETELY DEPENDENT
ADLS_ACUITY_SCORE: 43

## 2022-01-01 ASSESSMENT — PAIN SCALES - GENERAL
PAINLEVEL: NO PAIN (0)
PAINLEVEL: NO PAIN (0)

## 2022-02-11 NOTE — PROGRESS NOTES
Assessment & Plan     Acute cystitis without hematuria    - cephALEXin (KEFLEX) 500 MG capsule; Take 1 capsule (500 mg) by mouth 3 times daily    Dysuria    - UA with Microscopic reflex to Culture - Clinic Collect  - Urine Microscopic Exam  - Urine Culture    Urinary frequency    - UA with Microscopic reflex to Culture - Clinic Collect  - Urine Microscopic Exam  - Urine Culture             Push fluids  abx as directed     Return in about 2 weeks (around 2/25/2022) for recheck if not improving, regular primary provider.    ARLETH Alves [facility] YORDAN Dixon is a 86 year old who presents for the following health issues  accompanied by her daughter.    HPI     Genitourinary - Female  Onset/Duration: 3-4 weeks  Description:   Painful urination (Dysuria): no           Frequency: YES  Blood in urine (Hematuria): no  Delay in urine (Hesitency): no  Intensity: mild, moderate  Progression of Symptoms:  same  Accompanying Signs & Symptoms:  Fever/chills: no  Flank pain: no  Nausea and vomiting: no  Vaginal symptoms: none  Abdominal/Pelvic Pain: no  History:   History of frequent UTI s: no  History of kidney stones: no  Sexually Active: no  Possibility of pregnancy: No  Precipitating or alleviating factors: None  Therapies tried and outcome:         Review of Systems   Constitutional, HEENT, cardiovascular, pulmonary, gi and gu systems are negative, except as otherwise noted.      Objective    /66   Pulse 61   Temp 97.9  F (36.6  C) (Tympanic)   Resp 18   Wt 70.8 kg (156 lb)   SpO2 97%   BMI 25.56 kg/m    Body mass index is 25.56 kg/m .  Physical Exam   GENERAL: healthy, alert and no distress  RESP: lungs clear to auscultation - no rales, rhonchi or wheezes  CV: regular rates and rhythm and normal S1 S2, no S3 or S4  SKIN: no suspicious lesions or rashes    Results for orders placed or performed in visit on 02/11/22 (from the past 24 hour(s))   UA with  Microscopic reflex to Culture - Clinic Collect    Specimen: Urine, Clean Catch   Result Value Ref Range    Color Urine Yellow Colorless, Straw, Light Yellow, Yellow    Appearance Urine Cloudy (A) Clear    Glucose Urine Negative Negative mg/dL    Bilirubin Urine Negative Negative    Ketones Urine Negative Negative mg/dL    Specific Gravity Urine 1.025 1.003 - 1.035    Blood Urine Large (A) Negative    pH Urine 5.5 5.0 - 7.0    Protein Albumin Urine 30  (A) Negative mg/dL    Urobilinogen Urine 0.2 0.2, 1.0 E.U./dL    Nitrite Urine Positive (A) Negative    Leukocyte Esterase Urine Large (A) Negative   Urine Microscopic Exam   Result Value Ref Range    Bacteria Urine Few (A) None Seen /HPF    RBC Urine 2-5 (A) 0-2 /HPF /HPF    WBC Urine >100 (A) 0-5 /HPF /HPF

## 2022-03-04 NOTE — TELEPHONE ENCOUNTER
"Pt asking nurse to call her daughter Caryn to arrange any appointments. Called Caryn and left a message to return call to the clinic.     When she calls back please assist with an appt for pt to be see today or advise to UC for burning with urination.     Shu Paiz RN      Reason for Disposition    > 2 UTIs in last year    Additional Information    Negative: Shock suspected (e.g., cold/pale/clammy skin, too weak to stand, low BP, rapid pulse)    Negative: Sounds like a life-threatening emergency to the triager    Negative: Unable to urinate (or only a few drops) and bladder feels very full    Negative: Vomiting    Negative: Patient sounds very sick or weak to the triager    Negative: SEVERE pain with urination    Negative: Fever > 100.4 F (38.0 C)    Negative: Side (flank) or lower back pain present    Negative: Taking antibiotic > 24 hours for UTI and fever persists    Negative: Taking antibiotic > 3 days for UTI and painful urination not improved    Negative: Unusual vaginal discharge    Answer Assessment - Initial Assessment Questions  1. SEVERITY: \"How bad is the pain?\"  (e.g., Scale 1-10; mild, moderate, or severe)    - MILD (1-3): complains slightly about urination hurting    - MODERATE (4-7): interferes with normal activities      - SEVERE (8-10): excruciating, unwilling or unable to urinate because of the pain       5/10 moderate   2. FREQUENCY: \"How many times have you had painful urination today?\"       Every time with urination today   3. PATTERN: \"Is pain present every time you urinate or just sometimes?\"       Yes   4. ONSET: \"When did the painful urination start?\"       Couple of weeks   5. FEVER: \"Do you have a fever?\" If so, ask: \"What is your temperature, how was it measured, and when did it start?\"      No   6. PAST UTI: \"Have you had a urine infection before?\" If so, ask: \"When was the last time?\" and \"What happened that time?\"       Yes. Started on antibiotics   7. CAUSE: \"What do you " "think is causing the painful urination?\"  (e.g., UTI, scratch, Herpes sore)      UTI   8. OTHER SYMPTOMS: \"Do you have any other symptoms?\" (e.g., flank pain, vaginal discharge, genital sores, urgency, blood in urine)      Denies   9. PREGNANCY: \"Is there any chance you are pregnant?\" \"When was your last menstrual period?\"      No    Protocols used: URINATION PAIN - FEMALE-A-OH      "

## 2022-03-04 NOTE — TELEPHONE ENCOUNTER
Daughter called back. The clinic is now closed for the weekend. Clinic advised pt be seen today so advised daughter bring pt to [facility], open until 8 pm. Urgent Care does not do appointments; walk-in basis. Dtr voiced understanding and agreement.

## 2022-03-05 NOTE — PROGRESS NOTES
Assessment & Plan     Urinary tract infection without hematuria, site unspecified    - UA reflex to Microscopic and Culture  - Urine Microscopic Exam  - Urine Culture  - sulfamethoxazole-trimethoprim (BACTRIM DS) 800-160 MG tablet; Take 1 tablet by mouth 2 times daily for 5 days    Treat with Bactrim.  UC pending for sensitivities.  Continue with increased fluids.  Close Follow-up if any new or worsening sx prn.    Rama Gonsalez MD  [facility] YORDAN Dixon is a 86 year old who presents for the following health issues     HPI     UTI treated with Keflex 3 weeks ago- UC negative.  Sx now back with increased urgency and frequency and mild dysuria.  No fever or flank pain.  Here with daughter.      Review of Systems   Constitutional, HEENT, cardiovascular, pulmonary, GI, , musculoskeletal, neuro, skin, endocrine and psych systems are negative, except as otherwise noted.      Objective    /71 (BP Location: Left arm, Patient Position: Sitting, Cuff Size: Adult Regular)   Pulse 76   Temp 97.4  F (36.3  C) (Tympanic)   Resp 15   Wt 71.2 kg (157 lb)   SpO2 99%   BMI 25.73 kg/m    Body mass index is 25.73 kg/m .  Physical Exam   GENERAL: healthy, alert and no distress  EYES: Eyes grossly normal to inspection, PERRL and conjunctivae and sclerae normal  MS: no gross musculoskeletal defects noted, no edema  SKIN: no suspicious lesions or rashes  PSYCH: mentation appears normal, affect normal/bright    Results for orders placed or performed in visit on 03/04/22 (from the past 24 hour(s))   UA reflex to Microscopic and Culture    Specimen: Urine, Midstream   Result Value Ref Range    Color Urine Yellow Colorless, Straw, Light Yellow, Yellow    Appearance Urine Clear Clear    Glucose Urine Negative Negative mg/dL    Bilirubin Urine Negative Negative    Ketones Urine Negative Negative mg/dL    Specific Gravity Urine >=1.030 1.003 - 1.035    Blood Urine Large (A) Negative     pH Urine 6.0 5.0 - 7.0    Protein Albumin Urine >=300 (A) Negative mg/dL    Urobilinogen Urine 0.2 0.2, 1.0 E.U./dL    Nitrite Urine Negative Negative    Leukocyte Esterase Urine Moderate (A) Negative   Urine Microscopic Exam   Result Value Ref Range    Bacteria Urine Few (A) None Seen /HPF    RBC Urine  (A) 0-2 /HPF /HPF    WBC Urine  (A) 0-5 /HPF /HPF    Squamous Epithelials Urine Few (A) None Seen /LPF    Narrative    Microscopic exam performed on unspun urine.

## 2022-05-06 NOTE — PROGRESS NOTES
Chief Complaint   Patient presents with     UTI     Pt states she is having urinary urgency and frequency that has been coming on for the last couple of weeks          ICD-10-CM    1. Acute cystitis with hematuria  N30.01 cefdinir (OMNICEF) 300 MG capsule   2. Dysuria  R30.0 UA macro with reflex to Microscopic and Culture - Clinc Collect     Urine Microscopic Exam     Urine Culture   Patient will take antibiotics as prescribed.  She will follow-up with primary care provider if she has any residual symptoms once medication is completed.  She understands to go to the emergency room if she develops fever, chills, confusion, weakness, back pain nausea or vomiting.  She is encouraged to increase water intake.      Results for orders placed or performed in visit on 05/05/22 (from the past 24 hour(s))   UA macro with reflex to Microscopic and Culture - Clinc Collect    Specimen: Urine, Midstream   Result Value Ref Range    Color Urine Yellow Colorless, Straw, Light Yellow, Yellow    Appearance Urine Clear Clear    Glucose Urine Negative Negative mg/dL    Bilirubin Urine Negative Negative    Ketones Urine Negative Negative mg/dL    Specific Gravity Urine 1.020 1.003 - 1.035    Blood Urine Moderate (A) Negative    pH Urine 6.0 5.0 - 7.0    Protein Albumin Urine 30  (A) Negative mg/dL    Urobilinogen Urine 0.2 0.2, 1.0 E.U./dL    Nitrite Urine Positive (A) Negative    Leukocyte Esterase Urine Large (A) Negative   Urine Microscopic Exam   Result Value Ref Range    Bacteria Urine Moderate (A) None Seen /HPF    RBC Urine 5-10 (A) 0-2 /HPF /HPF    WBC Urine >100 (A) 0-5 /HPF /HPF    Squamous Epithelials Urine Moderate (A) None Seen /LPF    WBC Clumps Urine Present (A) None Seen /HPF       Subjective     Destiny Gomez is an 86 year old female who presents to clinic today for urinary frequency and urgency that has been worsening over the last couple of weeks.      ROS: 10 point ROS neg other than the symptoms noted above in the  HPI.       Objective    BP (!) 140/80   Pulse 65   Temp 97.5  F (36.4  C) (Tympanic)   Resp 16   Wt 71.2 kg (157 lb)   SpO2 99%   BMI 25.73 kg/m    Nurses notes and VS have been reviewed.    Physical Exam       GENERAL APPEARANCE: healthy appearing, alert     ABDOMEN:  soft, nontender, no HSM or masses and bowel sounds normal, no CVA tenderness     SKIN: no suspicious lesions or rashes     NEURO: Normal strength and tone, mentation intact and speech normal    Patient Instructions   Lots and lots of water.      ANALI Montoya, CNP  [facility] Provider    The use of [employer] dictation services may have been used to construct the content in this note; any grammatical or spelling errors are non-intentional. Please contact the author of this note directly if you are in need of any clarification.

## 2022-05-13 NOTE — PROGRESS NOTES
Assessment & Plan      She has been treated for bladder infections in February of this year as well as March and recently May 5 of this year.  Last culture was negative for any specific bacteria and showed genital isidro.  We sent a culture on urine done today.  We will have her see urology for further evaluation of frequent UTIs, urinary urgency, frequency and incontinence.    Urinary frequency    - UA Macro with Reflex to Micro and Culture - lab collect  - Urine Microscopic Exam  - Urine Culture  - Adult Urology Referral; Future    Acute cystitis with hematuria    - ciprofloxacin (CIPRO) 500 MG tablet; Take 1 tablet (500 mg) by mouth 2 times daily for 5 days  - Adult Urology Referral; Future      20 minutes spent on the date of the encounter doing chart review, review of test results, patient visit and documentation        See Patient Instructions    Return in about 1 week (around 5/20/2022), or if symptoms worsen or fail to improve, for Follow up with urology.    [facility] Provider  [facility] KIM Dixon is a 86 year old who presents for the following health issues  accompanied by her daughter.    HPI     Genitourinary - Female  Onset/Duration: Dx with UTI 5/5/22 placed on cefdinir with no relief. Culture was negative.  Description:   Painful urination (Dysuria): YES           Frequency: YES  Blood in urine (Hematuria): no  Delay in urine (Hesitency): no  Intensity: moderate  Progression of Symptoms:  worsening  Accompanying Signs & Symptoms:  Fever/chills: no  Flank pain: no  Nausea and vomiting: no  Vaginal symptoms: none  Abdominal/Pelvic Pain: no  History:   History of frequent UTI s: YES  History of kidney stones: no  Sexually Active: no  Possibility of pregnancy: No  Precipitating or alleviating factors: None  Therapies tried and outcome: ABX from 5/5/22 did not help    [place] and feels she can not go down for meals since she always has to urinate. Has had some  dribbling as well.          Review of Systems   Constitutional, HEENT, cardiovascular, pulmonary, gi and gu systems are negative, except as otherwise noted.      Objective    BP (!) 146/72 (BP Location: Right arm, Patient Position: Sitting, Cuff Size: Adult Regular)   Pulse 65   Temp 99  F (37.2  C) (Oral)   SpO2 95%   Breastfeeding No   There is no height or weight on file to calculate BMI.  Physical Exam   GENERAL: healthy, alert and no distress  RESP: lungs clear to auscultation - no rales, rhonchi or wheezes  CV: regular rates and rhythm, normal S1 S2, no S3 or S4 and no murmur, click or rub  ABDOMEN: soft, nontender, no hepatosplenomegaly, no masses and bowel sounds normal, no CVA tenderness    Results for orders placed or performed in visit on 05/13/22   UA Macro with Reflex to Micro and Culture - lab collect     Status: Abnormal    Specimen: Urine, Midstream   Result Value Ref Range    Color Urine Yellow Colorless, Straw, Light Yellow, Yellow    Appearance Urine Slightly Cloudy (A) Clear    Glucose Urine Negative Negative mg/dL    Bilirubin Urine Negative Negative    Ketones Urine Negative Negative mg/dL    Specific Gravity Urine 1.020 1.003 - 1.035    Blood Urine Moderate (A) Negative    pH Urine 6.5 5.0 - 7.0    Protein Albumin Urine 30  (A) Negative mg/dL    Urobilinogen Urine 0.2 0.2, 1.0 E.U./dL    Nitrite Urine Negative Negative    Leukocyte Esterase Urine Large (A) Negative   Urine Microscopic Exam     Status: Abnormal   Result Value Ref Range    Bacteria Urine None Seen None Seen /HPF    RBC Urine 10-25 (A) 0-2 /HPF /HPF    WBC Urine >100 (A) 0-5 /HPF /HPF

## 2022-05-22 NOTE — PROGRESS NOTES
Assessment & Plan     Pt has been seen for persistent urinary frequency and urgency, mild dysuria.  She has had multiple visits and UA/culture for this, treated with abx with no improvement of sx.    She does appear to have a caruncle on exam which may or may not have anything to do with her sx, however she has pyuria chronically which should not occur with a caruncle alone and no previous improvement with appropriate abx, no growth on all but one of her urine cultures.      Consider yeast infection as she does have genital erythema but no discharge. Will give trial of diflucan given she has been on multiple abx.    Barrier cream avoiding irritants to the area.      Check ureaplasma and mycoplasma cultures, low likelihood.      Discussed over active bladder, intersiticial cystitis, constipation and irritants as causes.  She is on no medicatons that should cause sx and no glucose in urrine, normal glucose on BMP.      Pt has follow-up with urology in the next week for further evaluation.  Will hold off on abx for now given previous sterile pyuria, but will call and treat if there is growth.          Dysuria    - UA reflex to Microscopic and Culture  - Urine Microscopic Exam  - Urine Culture  - Basic metabolic panel  (Ca, Cl, CO2, Creat, Gluc, K, Na, BUN)  - Ureaplasma and Mycoplasma culture  - Basic metabolic panel  (Ca, Cl, CO2, Creat, Gluc, K, Na, BUN)  - Ureaplasma and Mycoplasma culture    Pyuria    Urinary frequency    30 minutes spend with pt in face to face evaluation, interpretatin of labs ,reviewing previous documentation from dates above.        Brandi Jane PA-C  [facility] YORDAN Dixon is a 86 year old female who presents to clinic today for the following health issues:  Chief Complaint   Patient presents with     Urgent Care     Urinary frequency and pain when urinating      HPI  Pt presents to urgent care accompanied  By daughter Alma for ongoing urinary sx.       2-11-22: mixed urogenital isidro.  Treated with Keflex   3-4-22 10-50 K klebsiella and citrobacter freundii treated with bactrim    5-5-22 100K mixed urogenital isidro  Treated with cefdinir    5-13-22 less than 10 K mixed urogenital isidro.  Treated with Cipro.    Each time pyuria on exam   She has had no improvement with use of abx.      Pt denies vaginal itching, irritation, change of discharge.    Daughter reports that she is bathed weekly, does not consistently change undergarments but generally does not have any incontinence.   No blood or discharge noted.  No irritants to the area.    No fevers, chills nausea, vomiting, back pain.   No hematuria noted.    Has not tried azo.    Has CKD, last checked 6 months ago, normal creatinine, decreased GFR.    Has appt virtually with urology on Friday (5 days from now).    Daughter notes complaints more in the later day, morning not bad.  1 cup coffee per day, no other bladder irritants noted.    Feels like she emptys well  Has not had retention in the past.   No incontinence.    No constipation problems.    BP has been creeping up with urgent care visits.    Becoming a social problem and now staying in her room at her independent living facility rather than going down to have meals with others.      Review of Systems  Constitutional, HEENT, cardiovascular, pulmonary, gi and gu systems are negative, except as otherwise noted.      Objective    BP (!) 165/76   Pulse 66   Temp 97.8  F (36.6  C) (Tympanic)   SpO2 99%   Physical Exam   Pt is mildly confused, answers questions appropriately but repetitive questioning even after questions addressed.    nad appears well  Abdomen: Soft, non-distended, non-tender to light or deep palpation. No rebound or peritoneal signs. No masses or hsm.   Mm moist, skin turger good.    EG that of normal elderly female.    Mild to moderate erythema of the labia majora, minora and key hole distribution down to the anus.   No open wounds or  erosions.    No significant vaginal discharge noted.    There appears to be a urethral caruncle on the L, no bleeding or blood noted.    Results for orders placed or performed in visit on 05/22/22   UA reflex to Microscopic and Culture     Status: Abnormal    Specimen: Urine, Midstream   Result Value Ref Range    Color Urine Yellow Colorless, Straw, Light Yellow, Yellow    Appearance Urine Clear Clear    Glucose Urine Negative Negative mg/dL    Bilirubin Urine Negative Negative    Ketones Urine Negative Negative mg/dL    Specific Gravity Urine 1.010 1.003 - 1.035    Blood Urine Small (A) Negative    pH Urine 6.0 5.0 - 7.0    Protein Albumin Urine Negative Negative mg/dL    Urobilinogen Urine 0.2 0.2, 1.0 E.U./dL    Nitrite Urine Negative Negative    Leukocyte Esterase Urine Large (A) Negative   Urine Microscopic Exam     Status: Abnormal   Result Value Ref Range    Bacteria Urine Few (A) None Seen /HPF    RBC Urine 2-5 (A) 0-2 /HPF /HPF    WBC Urine  (A) 0-5 /HPF /HPF    Squamous Epithelials Urine Few (A) None Seen /LPF   Basic metabolic panel  (Ca, Cl, CO2, Creat, Gluc, K, Na, BUN)     Status: Abnormal   Result Value Ref Range    Sodium 138 133 - 144 mmol/L    Potassium 4.7 3.4 - 5.3 mmol/L    Chloride 108 94 - 109 mmol/L    Carbon Dioxide (CO2) 30 20 - 32 mmol/L    Anion Gap <1 (L) 3 - 14 mmol/L    Urea Nitrogen 19 7 - 30 mg/dL    Creatinine 0.82 0.52 - 1.04 mg/dL    Calcium 10.0 8.5 - 10.1 mg/dL    Glucose 104 (H) 70 - 99 mg/dL    GFR Estimate 70 >60 mL/min/1.73m2
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
## 2022-05-27 NOTE — PATIENT INSTRUCTIONS
Estrogen cream three times a week near urethra (pea-sized amount): If >$50, let me know and we can get via a compound pharmacy.    _________________________________________________________________  - Urine cytology to look for abnormal cells. Please make an appointment at your local [facility] lab to leave a urine sample.  - CT scan of the kidneys.   - Cystoscopy with the  urologist to evaluate the interior of the bladder. Follow up as recommended by the urologist.    CYSTOSCOPY    What is a Cystoscopy?  This is a procedure done to check for problems inside the bladder.  Problems may include polyps (growths), tumors, inflammation (swelling and redness) and other concerns.    The Urologist inserts a thin tube (called a cystoscope) into the bladder.  The tube is about the size of a pencil.  We will give you numbing medicine to reduce the pain or discomfort you may feel.    The Urologist will be able to see inside the bladder by filling the bladder with water.  The water makes it easier to see any problems that may be present. You will have a sense to need to urinate and this is normal.       How should I get ready for the exam?  Nothing to do to prepare. You may eat normally the day of the exam. There is no sedation, so you may drive yourself to and from if you can drive.       Please tell your doctor if:  You have a history of urinary tract infections.  You know that you have a tumor in your bladder.  You have bleeding problems.  You have any allergies.  You are or may be pregnant.      What happens after the exam?  You may go back to your normal diet and activity as you feel ready.    For the next two days after the exam, you may notice:  Some blood in your urine.  Some burning when you urinate (use the toilet).  An urge to urinate more often.  Bladder spasms.    These are normal after the procedure. They should go away on their own after a day or two.      You can help to relieve the above listed symptoms  by:  Drinking 6 to 8 large glasses of water each day (includes drinks at meals).  This will help clear the urine.  Take warm baths to relieve pain and bladder spasms.  Do not add anything to the bath water.  You may take Tylenol (acetaminophen) per label instructions for discomfort.

## 2022-06-03 NOTE — ED TRIAGE NOTES
Pt. Here with daughter Caryn, pt waiting for memory care placement. Pt. Complained of chest pain earlier in day, did take tylenol and is not complaining of chest pain now. Pt. Does states several times in triage she has to use the bathroom and has been urinating frequently. Pt. Daughter states this is normal for pt.      Triage Assessment     Row Name 06/03/22 1128       Triage Assessment (Adult)    Airway WDL WDL       Respiratory WDL    Respiratory WDL WDL       Skin Circulation/Temperature WDL    Skin Circulation/Temperature WDL WDL       Cardiac WDL    Cardiac WDL X;chest pain       Chest Pain Assessment    Chest Pain Location midsternal       Peripheral/Neurovascular WDL    Peripheral Neurovascular WDL WDL       Cognitive/Neuro/Behavioral WDL    Cognitive/Neuro/Behavioral WDL X  dementia

## 2022-06-03 NOTE — ED PROVIDER NOTES
"  History   Chief Complaint:  Chest Pain and Urinary Frequency     History limited by: dementia.   History supplemented by electronic chart review and daughter at bedside    Destiny Gomez is a 86 year old female with history of cancer, CKD, and hypertension who presents after experiencing chest pain at 1045. The patient was at the hospital for a CT scan when her symptoms arose. As she presents in the ED, she denies chest pain unless she is taking a deep breath or is applying pressure. She also experiences shortness of breath and longstanding urinary frequency. These symptoms are not new and generally get better as the day progresses. She lives at [facility].  She had 3 urinalyses performed in May, with urine cultures growing only mixed urogenital isidro, and had a virtual assessment by a local urology team on 27 May, and had been referred to the hospital today for an outpatient CT urogram as well as cytologic studies to evaluate for urinary neoplasm.  No history of coronary artery disease.    Review of Systems   Unable to perform ROS: Dementia     Allergies:  Seasonal allergies    Medications:  Estradiol  Losartan    Past Medical History:     Hypertension  CKD, stage III  Basal cell carcinoma  Breast cancer  MCI    Past Surgical History:    Knee surgery  Mastectomy modified radical    Family History:    Mother: diabetes mellitus type II, Alzheimer disease  Sister: ESRD    Social History:  The patient presents with her daughter, Caryn. The patient arrived to the ED via her daughters help.    Physical Exam     Patient Vitals for the past 24 hrs:   BP Temp Temp src Pulse Resp SpO2 Height Weight   06/03/22 1439 -- -- -- 71 19 97 % -- --   06/03/22 1428 135/66 -- -- 71 30 -- -- --   06/03/22 1126 (!) 143/69 98  F (36.7  C) Temporal 60 20 96 % 1.651 m (5' 5\") 67.6 kg (149 lb)     Physical Exam  General: Nontoxic-appearing woman sitting upright in room 23  HENT: mucous membranes moist  CV: rate as above, " regular rhythm, no lower extremity edema, no JVD, palpable symmetric radial pulses  Resp: normal effort, speaks in full phrases, no stridor, no cough observed  GI: abdomen soft and nontender, no guarding, negative Muñoz's sign  MSK: no bony tenderness to chest, chest wall changes consistent with prior mastectomy  Skin: appropriately warm and dry, no erythema or vesicles to chest wall  Neuro: alert, clear speech, follows basic commands and answer simple questions though poor and inconsistent historian,  equal, this was legs off bed, no nuchal rigidity  Psych: cooperative, no evidence of hallucinations    Emergency Department Course   ECG  ECG taken at 1130, ECG read at 1442  Normal sinus rhythm  Right bundle branch block  Possible inferior infarct, age undetermined   Rate 60 bpm. AR interval 166 ms. QRS duration 126 ms. QT/QTc 446/446 ms. P-R-T axes 7 16 20.     Imaging:  XR Chest 2 Views    (Results Pending)     Report per radiology    Laboratory:  Labs Ordered and Resulted from Time of ED Arrival to Time of ED Departure   COMPREHENSIVE METABOLIC PANEL - Abnormal       Result Value    Sodium 136      Potassium 3.6      Chloride 107      Carbon Dioxide (CO2) 23      Anion Gap 6      Urea Nitrogen 20      Creatinine 0.80      Calcium 10.3 (*)     Glucose 135 (*)     Alkaline Phosphatase 69      AST 19      ALT 23      Protein Total 7.3      Albumin 3.2 (*)     Bilirubin Total 0.4      GFR Estimate 72     CBC WITH PLATELETS AND DIFFERENTIAL - Abnormal    WBC Count 6.7      RBC Count 4.98      Hemoglobin 14.3      Hematocrit 46.1      MCV 93      MCH 28.7      MCHC 31.0 (*)     RDW 12.5      Platelet Count 199      % Neutrophils 67      % Lymphocytes 21      % Monocytes 8      % Eosinophils 3      % Basophils 0      % Immature Granulocytes 1      NRBCs per 100 WBC 0      Absolute Neutrophils 4.6      Absolute Lymphocytes 1.4      Absolute Monocytes 0.5      Absolute Eosinophils 0.2      Absolute Basophils 0.0       Absolute Immature Granulocytes 0.0      Absolute NRBCs 0.0     LIPASE - Normal    Lipase 358     TROPONIN I - Normal    Troponin I High Sensitivity 6     D DIMER QUANTITATIVE - Normal    D-Dimer Quantitative 0.45     TROPONIN I      Emergency Department Course:     Reviewed:  I reviewed nursing notes, vitals, past medical history and Care Everywhere    Assessments:  1432 I obtained history and examined the patient as noted above.   1700 I rechecked the patient and explained findings.       Interventions:  Tylenol 1,000mg PO    Disposition:  Signed out to Dr. Wilkinson at 1700.    Impression & Plan   Medical Decision Making:  Due to the patient's pre-existing dementia, she is a poor and unreliable historian, though it seems that acute nonexertional chest pain is her primary new symptom, as confirmed by her very pleasant and helpful daughter at bedside.  They seek evaluation for possible dangerous causes, though her work-up to this point does not reveal worrisome evidence of an acute coronary syndrome, pulmonary embolism, pneumonia, or other more dangerous process.  She is still pending a repeat troponin as well as a chest x-ray.  If these tests are benign, I think she will be an appropriate candidate for discharge and ongoing outpatient follow-up of her presenting chest discomfort as well as to continue her urologic work-up which is underway.  She does not have new urologic symptoms so I do not think that she needs emergent urogram here in the emergency department.  Daughter agrees with these findings and tentative plan of care, with the pending test signed out to my colleague Dr. Wilkinson who will address any unexpected abnormalities.      Diagnosis:    ICD-10-CM    1. Chest pain, unspecified type  R07.9    2. Urinary frequency  R35.0    3. History of dementia  Z86.59      Scribe Disclosure:  I, Blade Reyes, am serving as a scribe at 2:30 PM on 6/3/2022 to document services personally performed by Eliel Christopher  Angelito, based on my observations and the provider's statements to me.          Eliel Christopher MD  06/03/22 7151

## 2022-06-13 NOTE — TELEPHONE ENCOUNTER
Patient's daughter returned phone call and she informed me that urine cultures have been negative in the past. She is concerned that patient may have cystitis and has been using the hormonal cream regularly. Will send message to scheduling to see if there are any openings with any MD sooner.     Colette Cho LPN

## 2022-06-13 NOTE — TELEPHONE ENCOUNTER
[facility] Call Center    Phone Message    May a detailed message be left on voicemail: yes     Reason for Call: Symptoms or Concerns     If patient has red-flag symptoms, warm transfer to triage line    Current symptom or concern: urinary frequency and urgency getting worse / delirium / dehydrated    Symptoms have been present for:  3-4 day(s)    Has patient previously been seen for this? Yes    By : Oleg    Date: 05/27/22    Are there any new or worsening symptoms? Yes: Daughter states patient is getting worse and is losing sleep and would like to see if she can get her mother in earlier for a cystoscopy.  Please reach out to patient's daughter.  Please leave a voicemail for her and number to call back, per Caryn.      Action Taken: Other: Urology    Travel Screening: Not Applicable

## 2022-06-21 PROBLEM — F03.90 DEMENTIA (H): Status: ACTIVE | Noted: 2022-01-01

## 2022-06-21 NOTE — PROGRESS NOTES
ASSESSMENT/PLAN                                                      (R07.9) Acute chest pain  (primary encounter diagnosis)  Comment: no recurrent episodes since negative ER evaluation.  Plan: will continue to monitor; if recurrent symptoms, patient/daughter to contact MD.    (R21) Rash  Comment: right anterior thigh.  Plan: referred to dermatology for further evaluation - patient will be contacted to schedule.      (R09.81) Chronic nasal congestion  Comment: Intolerant of Flonase.  Plan: TRIAL of Singulair; if symptoms worsen, change, or do not improve, patient to contact MD.      Jessica Perez MD   [address] [address]  T: [phone], F: [phone]    SUBJECTIVE                                                      Destiny Gomez is a very pleasant 86 year old female who presents for ER follow-up:    Accompanied by daughter, who assists with history. Patient has dementia.    Presented to [facility] 6/3/2022 with acute, nonexertional chest pain. Evaluation including exam, EKG, CXR, and labs unremarkable. No recurrent episodes of chest pain since ER visit.    Patient does experience dizziness/lightheadedness when standing up too quickly after lying in bed.    She is also looking for an alternative to Flonase for her chronic nasal congestion and postnasal drip.    Finally, she has developed a rash on her right anterior thigh. Asymptomatic.    OBJECTIVE                                                      /72   Pulse 78   Resp 14   Wt 70.9 kg (156 lb 6.4 oz)   SpO2 96%   BMI 26.03 kg/m    Constitutional: well-appearing  Respiratory: normal respiratory effort; clear to auscultation bilaterally  Cardiovascular: regular rate and rhythm; no edema  Musculoskeletal: walks with walker   Psych: diminished judgment and insight; normal mood and affect; diminished recent and remote memory   Integumentary: well demarcated pink, shiny reticular rash, with overlying scale  right anterior thigh    ---    (Note documentation was completed, in part, with [employer] voice-recognition software. Documentation was reviewed, but some grammatical, spelling, and word errors may remain.)
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
## 2022-06-21 NOTE — PATIENT INSTRUCTIONS
TRIAL of Singulair once daily and consistently for congestion.    Recommend dermatology evaluation - you will be contacted to schedule an appointment.

## 2022-06-21 NOTE — NURSING NOTE
Chief Complaint   Patient presents with     Hematuria     Micro.      Urinary Frequency   Aleksandra Espinosa LPN

## 2022-07-27 NOTE — TELEPHONE ENCOUNTER
Assisted living called in needing a signed medication list faxed number in [employer] message routing to team

## 2022-07-28 NOTE — TELEPHONE ENCOUNTER
Iona RN, [facility], [phone].    -requesting updated med list. Pt has been prescribed Prozac; the nurse needs an order to administer.     Iona is going to fax request to provider.       [facility]: [phone].

## 2022-09-02 NOTE — TELEPHONE ENCOUNTER
Prescription approved per [facility] Refill Protocol.  John Roblero RN  [facility] Triage Nurse

## 2022-09-13 NOTE — TELEPHONE ENCOUNTER
HOWARD Price RN calling from [facility] ([phone], secure line)    Dee reports pressure patient has a sore on her left buttocks close to the medial edge. Daughter found this on Saturday, 9/10/2022.   2 cm x 2 cm   Stage 1 pressure ulcer  No tunneling  No pain  No drainage or infection concern  Dee placed a mepilex placed on it and will start off loading the patient.   Patient sits in the chair all the time, including at night.     Dee wanted the provider to be notified of this.     Pat Salcedo RN BSN MSN  [facility]

## 2022-09-16 NOTE — TELEPHONE ENCOUNTER
Routing refill request to provider for review/approval because:  Labs out of range/not current:    Recent Labs   Lab Test 03/16/18  0955 12/15/14  1159   CHOL 214* 184   HDL 59 55   * 109   TRIG 78 98   CHOLHDLRATIO  --  3.3

## 2022-09-23 NOTE — LETTER
09/26/22      Destiny Gomez  1935  [address]      [facility] [address]        To whom it may concern,    Please STOP Ms. Gomez's singulair.    Please RESTART Flonase 2 sprays/nostril daily and consistently.    Please INCREASE her Prozac from 10 to 20mg daily.    Please contact me with any question or concerns.        Jessica Perez MD   [address] [address]  T: [phone], F: [phone]                          
dry unproductive

## 2022-09-30 NOTE — TELEPHONE ENCOUNTER
Sent Mychart message to the patient to clarify which pharmacy she is using. Refill request coming from [facility] in [place]. Script sent 9/2/22 to [facility] - [place], [place] - [address] [place] [address].    Lauren Greer RN

## 2022-10-03 NOTE — TELEPHONE ENCOUNTER
Sigifredo from the [facility] the patient lives at called regarding patient's medication. Patient was put on Flonase on 9/26 and has been experiencing behaviors since. Patient has been chasing aides, saying random things, had 4 falls (no injuries), and not acting her self. Patient was on Flonase years ago and experienced similar behaviors during that time. [facility] is requesting permission to discontinue the Flonase because of these behaviors. Please call [facility] back with response and Fax form giving approval to fax number below.    Fax: [phone]    Bianca Dior RN  [facility] Triage Team

## 2022-10-03 NOTE — TELEPHONE ENCOUNTER
Okay to discontinue Flonase.    Please have them fax orders to me to be signed off on.     If her behaviors do not improve, they should let me know (her Prozac was increased at the same time her Flonase was restarted).

## 2022-10-04 PROBLEM — D72.829 LEUKOCYTOSIS, UNSPECIFIED TYPE: Status: ACTIVE | Noted: 2022-01-01

## 2022-10-04 PROBLEM — E83.42 HYPOMAGNESEMIA: Status: ACTIVE | Noted: 2022-01-01

## 2022-10-04 PROBLEM — M62.81 GENERALIZED MUSCLE WEAKNESS: Status: ACTIVE | Noted: 2022-01-01

## 2022-10-04 PROBLEM — R79.89 TROPONIN LEVEL ELEVATED: Status: ACTIVE | Noted: 2022-01-01

## 2022-10-04 PROBLEM — R00.1 BRADYCARDIA: Status: ACTIVE | Noted: 2022-01-01

## 2022-10-04 PROBLEM — N39.0 ACUTE UTI: Status: ACTIVE | Noted: 2022-01-01

## 2022-10-04 PROBLEM — I95.9 HYPOTENSION, UNSPECIFIED HYPOTENSION TYPE: Status: ACTIVE | Noted: 2022-01-01

## 2022-10-04 PROBLEM — R29.6 RECURRENT FALLS: Status: ACTIVE | Noted: 2022-01-01
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
## 2022-10-04 NOTE — LETTER
[facility] [address]  [address] [address]  [phone]    FACSIMILE TRANSMITTAL SHEET    TO: [facility]    _____URGENT _____REVIEW ONLY _____PLEASE COMMENT____PLEASE REPLY    NOTES/COMMENTS: Attached please find final discharge ordes for Destiny Gomez    Alena Shore, RN BSN, PHN, ACM-RN  7A RN Care Coordinator  Phone: [phone]  Pager [phone]    To contact the weekend [facility]  [place] (0800 - 1630) Saturday and Sunday    Units: 4A, 4C, 4E, 5A and 5B- Pager 1: [phone]    Units: 6A, 6B, 6C, 6D- Pager 2: [phone]    Units: 7A, 7B, 7C, 7D, and 5C-Pager 3: [phone]    [facility] ([phone]) Saturday and Sunday    Units: 5 Ortho, 8A, 10 ICU, & Pediatric Units-Pager 4: [phone]    10/11/2022 10:59 AM                                        IF YOU DID NOT RECEIVE THE CORRECT NUMBER OF PAGES OR THE FAX DID NOT COME THROUGH CLEARLY, PLEASE CALL THE SENDER     CONFIDENTIALITY STATEMENT: Confidential information that may accompany this transmission contains protected health information under state and federal law and is legally privileged. This information is intended only for the use of the individual or entity named above and may be used only for carrying out treatment, payment or other healthcare operations. The recipient or person responsible for delivering this information is prohibited by law from disclosing this information without proper authorization to any other party, unless required to do so by law or regulation. If you are not the intended recipient, you are hereby notified that any review, dissemination, distribution, or copying of this message is strictly prohibited. If you have received this communication in error, please destroy the materials and contact us immediately by calling the number listed above. No response indicates that the information was received by the appropriate authorized party

## 2022-10-04 NOTE — ED TRIAGE NOTES
BIBA from outside facility for hypotension bradycardic. SBP in 80's up to 90's after 250 ml NS en route. Baseline dementia. Per NH- Large wound on coccyx.     Triage Assessment     Row Name 10/04/22 3472       Triage Assessment (Adult)    Airway WDL WDL       Respiratory WDL    Respiratory WDL WDL       Skin Circulation/Temperature WDL    Skin Circulation/Temperature WDL WDL       Cardiac WDL    Cardiac WDL WDL       Peripheral/Neurovascular WDL    Peripheral Neurovascular WDL WDL       Cognitive/Neuro/Behavioral WDL    Cognitive/Neuro/Behavioral WDL X;all  baseline dementia    Level of Consciousness confused;lethargic    Arousal Level opens eyes spontaneously       [place] Coma Scale    Best Eye Response 4-->(E4) spontaneous    Best Motor Response 6-->(M6) obeys commands    Best Verbal Response 4-->(V4) confused    [place] Coma Scale Score 14

## 2022-10-04 NOTE — ED NOTES
Bed: [facility]  Expected date: 10/4/22  Expected time: 2:32 PM  Means of arrival: Ambulance  Comments:  [id number]  86F hypotension, AMS

## 2022-10-05 NOTE — PROGRESS NOTES
Care Management Initial Consult    General Information  Assessment completed with: Patient, Family, Gaetano-Son  Type of CM/SW Visit: Initial Assessment    Primary Care Provider verified and updated as needed: Yes   Readmission within the last 30 days: no previous admission in last 30 days      Reason for Consult: discharge planning  Advance Care Planning: Advance Care Planning Reviewed: present on chart          Communication Assessment  Patient's communication style: spoken language (English or Bilingual)             Cognitive  Cognitive/Neuro/Behavioral: .WDL except, orientation, speech  Level of Consciousness: confused  Arousal Level: arouses to voice, arouses to touch/gentle shaking  Orientation: disoriented to, place, time, situation  Mood/Behavior: calm, cooperative  Best Language: 0 - No aphasia  Speech: whispers, clear    Living Environment:   People in home: facility resident     Current living Arrangements: assisted living  Name of Facility: [facility] (P:[phone])   Able to return to prior arrangements: yes       Family/Social Support:  Care provided by: self  Provides care for: no one, unable/limited ability to care for self     Children          Description of Support System: Supportive, Involved    Support Assessment: Adequate family and caregiver support, Adequate social supports    Current Resources:   Patient receiving home care services: No     Community Resources:    Equipment currently used at home: walker, rolling  Supplies currently used at home:      Employment/Financial:  Employment Status: retired        Financial Concerns: No concerns identified   Referral to Financial Worker: No       Lifestyle & Psychosocial Needs:  Social Determinants of Health     Tobacco Use: Medium Risk     Smoking Tobacco Use: Former Smoker     Smokeless Tobacco Use: Never Used   Alcohol Use: Not on file   Financial Resource Strain: Not on file   Food Insecurity: Not on file   Transportation  Needs: Not on file   Physical Activity: Not on file   Stress: Not on file   Social Connections: Not on file   Intimate Partner Violence: Not on file   Depression: Not at risk     PHQ-2 Score: 2   Housing Stability: Not on file       Functional Status:  Prior to admission patient needed assistance:   Dependent ADLs:: Ambulation-walker, Wheelchair-with assist  Dependent IADLs:: Cleaning, Cooking, Laundry, Shopping, Meal Preparation, Medication Management, Money Management, Transportation, Incontinence       Mental Health Status:  Mental Health Status: No Current Concerns       Chemical Dependency Status:  Chemical Dependency Status: No Current Concerns             Values/Beliefs:  Spiritual, Cultural Beliefs, Taoism Practices, Values that affect care: no               Additional Information:  Chart reviewed. Case discussed w/ PT. PT rec Home with [facility] - and increased family support.     Writer met with pt's son, Gaetano, to discuss pt's living situation. Pt lives at [facility] (P:[phone]), where she is currently receiving assistance with ADLS from staff.  Family continues to be involved in her care there and assist with some cleaning of the home. Geatano is agreeable to [facility] recommendations at discharge. Gaetano reports that they are trying to get pt to walk down to the eating area to help pt with strengthing. Family working on increasing the level of cares that pt is receiving at [facility].    D/t acuity on the unit, writer was unable to f/u with [facility] today to determine what cares they can increase pt to.     Social work will continue to follow and provide assistance to ensure a safe and timely discharge.     ________________    EVELIO Aldana, [facility]  ED/Observation   [facility]  Phone: [phone]  Pager: [phone]  Fax: [phone]    On-call pager, [phone], 4:00pm to midnight

## 2022-10-05 NOTE — PROGRESS NOTES
"Evaluation completed in ED.     Shyann Mariscal, PT. Pager [phone]      10/05/22 1027   Quick Adds   Type of Visit Initial PT Evaluation       Present no   Living Environment   People in Home facility resident   Current Living Arrangements assisted living  (memory care unit)   Home Accessibility wheelchair accessible   Transportation Anticipated family or friend will provide   Living Environment Comments Pt's daughter present to provide PLOF. Reports pt lives in an [facility] in the memory care unit. Facility is w/c accessible. Family provides transportation.   Self-Care   Usual Activity Tolerance moderate   Current Activity Tolerance poor   Regular Exercise No   Equipment Currently Used at Home walker, rolling   Fall history within last six months yes   Number of times patient has fallen within last six months 8   Activity/Exercise/Self-Care Comment Receives assistance with ADL's including toileting, bathing, and dressing. Shanae with 4WW. Pt's daughter reports decreased motivation to mobilize in recenty weeks. 8 falls reported in last 6 months. No regular exercise. Staff is able to increase assistance as needed. Has Life Alert button.   General Information   Onset of Illness/Injury or Date of Surgery 10/04/22   Referring Physician Vitaliy Campos MD   Patient/Family Therapy Goals Statement (PT) none stated   Pertinent History of Current Problem (include personal factors and/or comorbidities that impact the POC) per EMR: \"Destiny Gomez is a 86 year old female with a PMHx significant for HTN, dementia, breast cancer s/p bilateral mastectomy and tamoxifen treatment who is presenting from her memory care facility for worsening confusion and weakness associated with low blood pressures.\"   Existing Precautions/Restrictions fall   Weight-Bearing Status - LUE full weight-bearing   Weight-Bearing Status - RUE full weight-bearing   Weight-Bearing Status - LLE full weight-bearing   Weight-Bearing Status - RLE full " weight-bearing   Cognition   Orientation Status (Cognition) oriented to;person   Pain Assessment   Patient Currently in Pain No   Integumentary/Edema   Integumentary/Edema no deficits were identifed   Posture    Posture Forward head position;Protracted shoulders   Range of Motion (ROM)   Range of Motion ROM is WFL   ROM Comment Limted ROM in B hamstrings   Strength (Manual Muscle Testing)   Strength (Manual Muscle Testing) strength is WFL   Strength Comments generalized weakness but grossly 3/5 in B LE   Bed Mobility   Comment, (Bed Mobility) supine > sit with mod A for trunk   Transfers   Comment, (Transfers) sit > stand with min A x2 with HHA   Gait/Stairs (Locomotion)   Comment, (Gait/Stairs) gait impaired   Balance   Balance Comments fair(-) standing balance   Sensory Examination   Sensory Perception patient reports no sensory changes   Coordination   Coordination no deficits were identified   Muscle Tone   Muscle Tone no deficits were identified   Clinical Impression   Criteria for Skilled Therapeutic Intervention Yes, treatment indicated   PT Diagnosis (PT) impaired functional mobility; risk for falls   Influenced by the following impairments decreased balance, strength, and endurance; decreased cognition   Functional limitations due to impairments difficulty with bed mobility, transfers, and walking   Clinical Presentation (PT Evaluation Complexity) Stable/Uncomplicated   Clinical Presentation Rationale per clinical judgment   Clinical Decision Making (Complexity) low complexity   Planned Therapy Interventions (PT) balance training;bed mobility training;gait training;home exercise program;motor coordination training;neuromuscular re-education;patient/family education;ROM (range of motion);strengthening;stretching;transfer training;progressive activity/exercise;risk factor education;home program guidelines   Anticipated Equipment Needs at Discharge (PT)   (tbd)   Risk & Benefits of therapy have been explained  evaluation/treatment results reviewed;care plan/treatment goals reviewed;risks/benefits reviewed;current/potential barriers reviewed;participants voiced agreement with care plan;participants included;patient;daughter   PT Discharge Planning   PT Discharge Recommendation (DC Rec) home with assist;home with home care physical therapy   PT Rationale for DC Rec Pt is demonstrating functional mobility below baseline. Primarily limited by decreased balance, strength, and cognition. High risk for falls. Pt prefers for pt to return to memory care unit at [facility] to reduce confusion. At this time, if pt returns to [facility], will require 24/7 supervision and assistance with ADL's and mobility as well as HH PT. Daughter reports facility can increase assistance as needed. Pt's daughter is in agreement to plan   PT Brief overview of current status Ax2 with gait belt for bed <> commode   Total Evaluation Time   Total Evaluation Time (Minutes) 5   Physical Therapy Goals   PT Frequency 5x/week   PT Predicted Duration/Target Date for Goal Attainment 10/12/22   PT: Bed Mobility Supervision/stand-by assist;Supine to/from sit;Rolling;Bridging  (with HOB flat)   PT: Transfers Supervision/stand-by assist;Sit to/from stand;Bed to/from chair  (with LRAD)   PT: Gait Supervision/stand-by assist;50 feet  (with LRAD)

## 2022-10-05 NOTE — CONSULTS
[facility] Nurse Inpatient Assessment     Consulted for: sacrum    Patient History (according to provider note(s):        Destiny Gomez is a 86 year old female with a PMHx significant for HTN, dementia, breast cancer s/p bilateral mastectomy and tamoxifen treatment who is presenting from her memory care facility for worsening confusion and weakness associated with low blood pressures.    Areas Assessed:      Areas visualized during today's visit: Sacrum/coccyx    Pressure Injury Location: sacrum      10/5    Wound type: Pressure Injury, Shear and Incontinence Associated Dermatitis (IAD)     Pressure Injury Stage: 3, present on admission   Wound history/plan of care:   Pt with increased confusion and weakness from memory care unit, pressure injury present on admission   Wound base: 75 % granulation tissue, pale, 25 % slough     Palpation of the wound bed: normal      Drainage: small     Description of drainage: serous     Measurements (length x width x depth, in cm) L buttock 2cm x 2cm x 0.2cm, sacral 2.5cm x 1.5cm x 0.5cm      Tunneling N/A     Undermining N/A  Periwound skin: Erythema- blanchable      Color: red      Temperature: normal   Odor: none  Pain: mild, tender  Pain intervention prior to dressing change: slow and gentle cares   Treatment goal: Heal  and Protection  STATUS: initial assessment  Supplies ordered: discussed with RN    My PI Risk Assessment     Sensory Perception: 2 - Very Limited     Moisture: 2 - Very moist      Activity: 3 - Walks occasionally      Mobility: 3 - Slightly limited      Nutrition: 2 - Probably inadequate      Friction/Shear: 2 - Potential problem      TOTAL: 14       Treatment Plan:     Sacral wound(s): Every 3 days and as needed if soiled or loose cleanse with microKlenz and dry, apply a small amount of triad paste ([id number]) over wound beds only, then cover with sacral mepilex. Turns q2hrs from side to side. Lift foot of bed  prior to lifting head of bed to reduce shearing.   If patient is incontinent of stool frequently requiring changing of mepilex more than once per shift, discontinue mepilex and start critic-aid paste BID and prn over wounds to maintain barrier.     Orders: Written    RECOMMEND PRIMARY TEAM ORDER: None, at this time  Education provided: plan of care  Discussed plan of care with: Nurse  WOC nurse follow-up plan: weekly  Notify WOC if wound(s) deteriorate.  Nursing to notify the Provider(s) and re-consult the WOC Nurse if new skin concern.    DATA:     Current support surface: Standard  ED cart, pulsate ordered   Containment of urine/stool: Incontinence Protocol  BMI: There is no height or weight on file to calculate BMI.   Active diet order: Orders Placed This Encounter      Regular Diet Adult     Output: I/O last 3 completed shifts:  In: -   Out: 300 [Urine:300]     Labs: Recent Labs   Lab 10/05/22  0920 10/05/22  0706 10/04/22  1851   ALBUMIN  --   --  2.5*   HGB  --  12.9  --    INR 1.20*  --   --    WBC  --  11.8*  --    CRP  --  148.00*  --        Dept. Pager: [phone]  Dept. Office Number: [phone]
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
## 2022-10-05 NOTE — PROGRESS NOTES
Shift: [id number]    Neuro: History of dementia, disoriented to place, situation, and time. Pt confused and shouting intermittently throughout shift. Calms with nurse presence and reorientation to situation.  Cardiac: Cont cardiac monitoring r/t abnormal ECG  Respiratory: WDL, 96% on RA. Pt reports stuffed sinuses.  GI/: increased urinary frequency, purewick in place when in bed. Pivots to commode w/ A2.  Diet/Appetite: Reg diet, consumed only bites of applesauce this shift.  Activity: A2 for repositions and pivot to commode.  Pain: denied pain this shift  Skin: bruising to left hip. Scattered bruising across length of body. Pressure injury to sacrum covered with sacral mepilex  Lines: PIV to right arm infiltrated @ 0500. Awaiting vascular consult. And provider clarification d/t pt history of bilateral mastectomy.    Vitals: Blood pressure 124/59, pulse 78, temperature (!) 96.6  F (35.9  C), temperature source Axillary, resp. rate 18, SpO2 96 %RA

## 2022-10-05 NOTE — PROGRESS NOTES
"   10/05/22 1027   Quick Adds   Type of Visit Initial PT Evaluation       Present no   Living Environment   People in Home facility resident   Current Living Arrangements assisted living  (memory care unit)   Home Accessibility wheelchair accessible   Transportation Anticipated family or friend will provide   Living Environment Comments Pt's daughter present to provide PLOF. Reports pt lives in an [facility] in the memory care unit. Facility is w/c accessible. Family provides transportation.   Self-Care   Usual Activity Tolerance moderate   Current Activity Tolerance poor   Regular Exercise No   Equipment Currently Used at Home walker, rolling   Fall history within last six months yes   Number of times patient has fallen within last six months 8   Activity/Exercise/Self-Care Comment Receives assistance with ADL's including toileting, bathing, and dressing. Shanae with 4WW. Pt's daughter reports decreased motivation to mobilize in recenty weeks. 8 falls reported in last 6 months. No regular exercise. Staff is able to increase assistance as needed. Has Life Alert button.   General Information   Onset of Illness/Injury or Date of Surgery 10/04/22   Referring Physician Vitaliy Campos MD   Patient/Family Therapy Goals Statement (PT) none stated   Pertinent History of Current Problem (include personal factors and/or comorbidities that impact the POC) per EMR: \"Destiny Gomez is a 86 year old female with a PMHx significant for HTN, dementia, breast cancer s/p bilateral mastectomy and tamoxifen treatment who is presenting from her memory care facility for worsening confusion and weakness associated with low blood pressures.\"   Existing Precautions/Restrictions fall   Weight-Bearing Status - LUE full weight-bearing   Weight-Bearing Status - RUE full weight-bearing   Weight-Bearing Status - LLE full weight-bearing   Weight-Bearing Status - RLE full weight-bearing   Cognition   Orientation Status (Cognition) " oriented to;person   Pain Assessment   Patient Currently in Pain No   Integumentary/Edema   Integumentary/Edema no deficits were identifed   Posture    Posture Forward head position;Protracted shoulders   Range of Motion (ROM)   Range of Motion ROM is WFL   ROM Comment Limted ROM in B hamstrings   Strength (Manual Muscle Testing)   Strength (Manual Muscle Testing) strength is WFL   Strength Comments generalized weakness but grossly 3/5 in B LE   Bed Mobility   Comment, (Bed Mobility) supine > sit with mod A for trunk   Transfers   Comment, (Transfers) sit > stand with min A x2 with HHA   Gait/Stairs (Locomotion)   Comment, (Gait/Stairs) gait impaired   Balance   Balance Comments fair(-) standing balance   Sensory Examination   Sensory Perception patient reports no sensory changes   Coordination   Coordination no deficits were identified   Muscle Tone   Muscle Tone no deficits were identified   Clinical Impression   Criteria for Skilled Therapeutic Intervention Yes, treatment indicated   PT Diagnosis (PT) impaired functional mobility; risk for falls   Influenced by the following impairments decreased balance, strength, and endurance; decreased cognition   Functional limitations due to impairments difficulty with bed mobility, transfers, and walking   Clinical Presentation (PT Evaluation Complexity) Stable/Uncomplicated   Clinical Presentation Rationale per clinical judgment   Clinical Decision Making (Complexity) low complexity   Planned Therapy Interventions (PT) balance training;bed mobility training;gait training;home exercise program;motor coordination training;neuromuscular re-education;patient/family education;ROM (range of motion);strengthening;stretching;transfer training;progressive activity/exercise;risk factor education;home program guidelines   Anticipated Equipment Needs at Discharge (PT)   (tbd)   Risk & Benefits of therapy have been explained evaluation/treatment results reviewed;care plan/treatment  goals reviewed;risks/benefits reviewed;current/potential barriers reviewed;participants voiced agreement with care plan;participants included;patient;daughter   PT Discharge Planning   PT Discharge Recommendation (DC Rec) home with assist;home with home care physical therapy  (memory care unit at [facility])   PT Rationale for DC Rec Pt is demonstrating functional mobility below baseline. Primarily limited by decreased balance, strength, and cognition. High risk for falls. Pt prefers for pt to return to memory care unit at [facility] to reduce confusion. At this time, if pt returns to [facility], will require 24/7 supervision and assistance with ADL's and mobility as well as HH PT. Daughter reports facility can increase assistance as needed. Pt's daughter is in agreement to plan   PT Brief overview of current status Ax2 with gait belt for bed <> commode   Total Evaluation Time   Total Evaluation Time (Minutes) 5   Physical Therapy Goals   PT Frequency 5x/week   PT Predicted Duration/Target Date for Goal Attainment 10/12/22   PT: Bed Mobility Supervision/stand-by assist;Supine to/from sit;Rolling;Bridging  (with HOB flat)   PT: Transfers Supervision/stand-by assist;Sit to/from stand;Bed to/from chair  (with LRAD)   PT: Gait Supervision/stand-by assist;50 feet  (with LRAD)

## 2022-10-05 NOTE — PROGRESS NOTES
MD paged throughout shift requesting order/ok to place PIV on pt. Currently no PIV due to bilateral mastectomy (with possible lymph node removal per daughter). Pt and daughter unsure of which side cancer was on. MD updated. Vascular access requests that MD places order to specify which side PIV will be placed due to hx. MD paged with this information. Abx due to be given at 1700.

## 2022-10-05 NOTE — PLAN OF CARE
OT: after observation and conversation with this pt it is concluded that she has no acute OT needs, pt currently living in memory care and appears to be at her baseline cognition. Pt with insight to her living situation but confused that she is in the hospital. Pt should return to memory care as previous. PT currently following for mobility and addressing falls.
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
## 2022-10-05 NOTE — PROGRESS NOTES
Patient has history of bilateral breast CA s/p bilateral radical mastectomy on 2002, ED bedside nurse Narinder is unsure if there's any arm precaution, he then consulted his Charge nurse Reyna, previous PIV was inserted on R forearm 10/4, writer requested for an okay order instead for PIV insertion from MD & the laterality since patient has Dementia and couldn't give information as well with regards to her surgery.Notified ED bedside nurse to notify Vascular once order is given.

## 2022-10-05 NOTE — PROGRESS NOTES
[facility]    Medicine Progress Note - Hospitalist Service, GOLD TEAM 12    Date of Admission:  10/4/2022    Assessment & Plan  Destiny Gomez is a 86 year old female with a PMHx significant for HTN, dementia, breast cancer s/p bilateral mastectomy and tamoxifen treatment who is presenting from her memory care facility for worsening confusion and weakness associated with low blood pressures found to have a urinary tract infection as well as bilateral staghorn caliculi and bladder outlet obstruction / bladder atony.    Today  - urology consulted - renal ultrasound in 2 days, rose  - continue CTX, follow up UCx, nothing so far, broaden if septic     Sepsis due to Acute Pyelonephritis with Staghorn Caliculi  Acute Encephalopathy due to above, improved  Elevated CRP likely in the setting of acute infection. No major metabolic derangments, head CT normal, no suspicious meds. History of hypervirulent klebsiella.  - Continue IVFs  - Ceftriaxone 1g continued, would escalate if decompensation  - Follow up UCx  - Urology Consulted - rose for now, renal ultrasound in 2 days, possible intervention if decompensates     Acute Kidney Injury, improved - baseline 0.9, presented with 1.2, multifactorial -> obstruction / post obstructive renal picture, also pre-renal possible given sepsis on presentation.  - tremd     Sacral Pressure Wound  Has been noted during care checks at her memory care facility. Last note there graded as a stage 1 pressure ulcer without pain / drainage / tunneling.  - WOC consult     HTN  - holding PTA losartan in setting of hypotension on presentation     Dementia  Recurrent Falls  Livers in memory care, daughter Caryn involved, several falls of recent.  - PT / OT consult     AVA  Per daughter Caryn's report, Destiny becomes anxious often and has had panic attacks in the past. Is prescribed prozac to manage AVA.  - continue PTA prozac     EKG with right bundle branch  block  EKG done in the ED incidentally noted RBBB. No previous EKGs to compare to. Troponin 25 --> 23 in ED without any concerning signs / features of cardiac symptoms.   - no chest pain, troponin low and down trending       Diet: Regular Diet Adult    DVT Prophylaxis: Enoxaparin (Lovenox) SQ  Collins Catheter: PRESENT, indication: Retention  Central Lines: None  Cardiac Monitoring: None  Code Status: No CPR- Do NOT Intubate      Disposition Plan     Expected Discharge Date: 10/06/2022                The patient's care was discussed with the Patient.    Lon Martinez DO  Hospitalist Service, GOLD TEAM [address] [place] [address]  Securely message with the Vocera Web Console (learn more here)  Text page via [facility] Paging/Directory   Please see signed in provider for up to date coverage information      Clinically Significant Risk Factors Present on Admission             # Hypoalbuminemia: Albumin = 2.5 g/dL (Ref range: 3.5 - 5.2 g/dL) on admission, will monitor as appropriate   # Coagulation Defect: INR = 1.20 (Ref range: 0.85 - 1.15) and/or PTT = N/A on admission, will monitor for bleeding   # Hypertension: home medication list includes antihypertensive(s)          ______________________________________________________________________    Interval History   Somnolent, pleasant, diffuse abdominal pain that is described as severe with guarding. No fevers. AO to self but not place or time.    A 4 point review of systems evaluated including questioning patient about cardiovascular symptoms, gastrointestinal symptoms, respiratory symptoms, constitutional symptoms, and hematology / bleeding symptoms and all were negative except as noted in HPI above.    Data reviewed today: I reviewed all medications, new labs and imaging results over the last 24 hours. I personally reviewed no images or EKG's today.    Physical Exam   Vital Signs: Temp: (!) 96.6  F (35.9  C) Temp src: Axillary BP: 120/57  Pulse: 75   Resp: 15 SpO2: 97 % O2 Device: None (Room air)    Weight: 0 lbs 0 oz     General: non-distressed adult female, obtunded to somnolent  HEENT: Normocephalic, atraumatic, pupils equal round reactive, membranes moist  CV: normal capillary refill, tachycardic but regular rhythm  Respiratory: Non-labored breathing, bronchovesicular lung sounds  Abdominal: soft, exuisitely tender throughout with guarding, no rebound, no rigidity, +bowel sounds  Genitourinary: no suprapubic tenderness  Musculoskeletal: normal bulk and tone  Skin: no rash, normal turgor  Neurologic: no facial droop, moving upper and lower extremities spontaneously, sensation in tact to light touch on extremities  Psychiatric: normal mood and affect    Data   Recent Labs   Lab 10/05/22  1326 10/05/22  0920 10/05/22  0706 10/04/22  1851 10/04/22  1552   WBC 11.5*  --  11.8*  --  13.9*   HGB 12.5  --  12.9  --  14.0   MCV 86  --  84  --  85     --  249  --  341   INR  --  1.20*  --   --   --    NA  --   --  133* 129* 131*   POTASSIUM  --   --  4.2 3.8 3.9   CHLORIDE  --   --  102 96* 96*   CO2  --   --  19* 17* 25   BUN  --   --  17.9 20.4 20.4   CR  --   --  1.18* 1.25* 1.22*   ANIONGAP  --   --  12 16* 10   BERNARDINO  --   --  9.3 9.5 10.1   GLC  --   --  118* 110* 137*   ALBUMIN  --   --   --  2.5* 3.4*   PROTTOTAL  --   --   --  5.4* 6.7   BILITOTAL  --   --   --  0.5 0.6   ALKPHOS  --   --   --  75 91   ALT  --   --   --  12 19   AST  --   --   --  19 20

## 2022-10-06 NOTE — PLAN OF CARE
/56 (BP Location: Right arm)   Pulse 74   Temp 97.6  F (36.4  C) (Oral)   Resp 16   SpO2 92%     Shift: 2300-073-  VS: VSS on RA, afebrile  Neuro: Aox1, oriented to self  BG: none  Labs: UC pending  Respiratory: WDL  Pain/Nausea/PRN: denies pain or nausea  Diet: regular  LDA: PIV SL, rose  GI/: rose with nanette output  Skin: sacral wound with WOC orders to changed q3 days  Mobility: asx2 with walker  Plan: finish adult profile with daughter present    Handoff given to following RN.

## 2022-10-06 NOTE — PLAN OF CARE
Goal Outcome Evaluation:  AVSS. Denied pain.Oriented to self only. Pt is confused.Pleasant and cooperative for most part; agitated x1 when writter attempted to start IV fluid.  LR is infusing at 100 ml/hr. Rose with adequate UOP; pt refused rose care.Pt worked with physical therapy. Sacral pressure dressing change done per night RN. Admission profile done with daughter.Chest x-ray done; result is pending.  Son and daughter were at bed side.  Bed alarm is maintained for safety.  Continue with POC.

## 2022-10-06 NOTE — PROGRESS NOTES
Message received from [facility] [place], [place] from *[phone]  Ok to use either extremity for IV as long as there is no edema.   Vascular access paged.

## 2022-10-06 NOTE — PROGRESS NOTES
Paged provider concerning need for tele. Patient has been assigned a bed on 7A, but they can not accept tele.   Awaiting return call/orders.     Orders received to discontinue tele.

## 2022-10-06 NOTE — PROGRESS NOTES
Care Management Follow Up    Length of Stay (days): 2    Expected Discharge Date: 10/10/2022     Concerns to be Addressed:       Patient plan of care discussed at interdisciplinary rounds: Yes    Anticipated Discharge Disposition:  [facility]      Anticipated Discharge Services:  Home Health Care    Anticipated Discharge DME:  Walker (Has at [facility])    Patient/family educated on Medicare website which has current facility and service quality ratings:  N/A    Education Provided on the Discharge Plan:  Yes    Patient/Family in Agreement with the Plan:  Yes    Referrals Placed by CM/SW:  N/A    Private pay costs discussed: Not applicable    Additional Information:    Writer spoke with Dee at [facility]. Dee confirmed that patient resides there in the Memory Care Unit.   They provide assistance for her with her medications, Hygiene and toilet ing. She has been independent with dressing and ambulates with a walker. They are able to provide increased services if needed.   They work with [facility]. They request that patient not return home on the weekend and that she return home prior to 2 pm.     10:44 Writer spoke [facility] patient's daughter Caryn to update her on plan for patient to return to [facility] next week with [facility]. She is in agreement with the plan. She plans to transport patient if able. Referral made to [facility] for PT/OT/RN/HHA post discharge.     11:25 Confirmation received from [facility] that they are able to accept patient. Order placed in [facility].    The [address], [address]  P: [phone]  F: [phone]    ZAIRE Bhandari, MSW  Adult Acute Care Float   Pager [phone]

## 2022-10-06 NOTE — PROGRESS NOTES
Admitted/transferred from: ED  Time of arrival on unit 2300  2 RN full  skin assessment completed by Janessa PATEL and Martine MICHAELS  Skin assessment finding: issues found bruising on R shin, bilateral hips, hands. pt has numerous dry skin tags, sacral pressure injury   Interventions/actions: WOC consult ordered     Will continue to monitor.

## 2022-10-06 NOTE — PROGRESS NOTES
[facility]    Medicine Progress Note - Hospitalist Service, GOLD TEAM 12    Date of Admission:  10/4/2022    Assessment & Plan  Destiny Gomez is a 86 year old female with a PMHx significant for HTN, dementia, breast cancer s/p bilateral mastectomy and tamoxifen treatment who is presenting from her memory care facility for worsening confusion and weakness associated with low blood pressures found to have a urinary tract infection as well as bilateral staghorn caliculi and bladder outlet obstruction / bladder atony.    Today  - CXR, Flonase, Lactated Ringers 100 ml / hr  - CTX for UTI, no UCx but clinically responding well, anticipate 14 days given pyelonephritis, will de-escalate to orals in coming 1-2 days     Sepsis due to Acute Pyelonephritis with Staghorn Caliculi, sepsis resolved  Acute Encephalopathy due to above, improved  Elevated CRP likely in the setting of acute infection. No major metabolic derangments, head CT normal, no suspicious meds. History of hypervirulent klebsiella.  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  - tremd     Sacral Pressure Wound  Has been noted during care checks at her memory care facility. Last note there graded as a stage 1 pressure ulcer without pain / drainage / tunneling.  - WOC consult     HTN  - holding PTA losartan in setting of hypotension on presentation     Dementia  Recurrent Falls  Livers in memory care, daughter Caryn involved,  several falls of recent.  - PT / OT consult     AVA  Per daughter Caryn's report, Destiny becomes anxious often and has had panic attacks in the past. Is prescribed prozac to manage AVA.  - continue PTA prozac     EKG with right bundle branch block  EKG done in the ED incidentally noted RBBB. No previous EKGs to compare to. Troponin 25 --> 23 in ED without any concerning signs / features of cardiac symptoms.   - no chest pain, troponin low and down trending       Diet: Regular Diet Adult    DVT Prophylaxis: Enoxaparin (Lovenox) SQ  Rose Catheter: PRESENT, indication: Retention  Central Lines: None  Cardiac Monitoring: None  Code Status: No CPR- Do NOT Intubate      Disposition Plan      Expected Discharge Date: 10/10/2022,  9:00 AM      Discharge Comments: awaiting follow up imaging planned for 10/7  UTI, from [facility], rose d/t pyelo and stones, WOC consulted, repeat u/s 48 hours to reassess rose  10.6: [facility] confirmed, OK to return to [facility] facility (by 2pm on day of d/c)        The patient's care was discussed with the Patient.    Lon Martinez DO  Hospitalist Service, GOLD TEAM [address] [place] [address]  Securely message with the Vocera Web Console (learn more here)  Text page via [facility] Paging/Directory   Please see signed in provider for up to date coverage information      Clinically Significant Risk Factors Present on Admission                      ______________________________________________________________________    Interval History   Sleepy this morning, pleasant when awoken, no overnight events, urine cultures no growing anything, daughter at bedside questions answered, long discussion about big picture with Destiny.    A 4 point review of systems evaluated including questioning patient about cardiovascular symptoms, gastrointestinal symptoms, respiratory symptoms, constitutional symptoms, and hematology / bleeding symptoms and all were negative except as noted in  HPI above.    Data reviewed today: I reviewed all medications, new labs and imaging results over the last 24 hours. I personally reviewed no images or EKG's today.    Physical Exam   Vital Signs: Temp: 97.7  F (36.5  C) Temp src: Oral BP: 127/57 Pulse: 76   Resp: 16 SpO2: 99 % O2 Device: None (Room air)    Weight: 0 lbs 0 oz     General: non-distressed adult female, obtunded to somnolent  HEENT: Normocephalic, atraumatic, pupils equal round reactive, membranes moist  CV: normal capillary refill, tachycardic but regular rhythm  Respiratory: Non-labored breathing, bronchovesicular lung sounds  Abdominal: soft, exuisitely tender throughout with guarding, no rebound, no rigidity, +bowel sounds  Genitourinary: no suprapubic tenderness  Musculoskeletal: normal bulk and tone  Skin: no rash, normal turgor  Neurologic: no facial droop, moving upper and lower extremities spontaneously, sensation in tact to light touch on extremities  Psychiatric: normal mood and affect    Data   Recent Labs   Lab 10/06/22  0527 10/05/22  1326 10/05/22  0920 10/05/22  0706 10/04/22  1851   WBC 7.5 11.5*  --  11.8*  --    HGB 12.2 12.5  --  12.9  --    MCV 87 86  --  84  --     291  --  249  --    INR  --   --  1.20*  --   --    *  --   --  133* 129*   POTASSIUM 3.4  --   --  4.2 3.8   CHLORIDE 103  --   --  102 96*   CO2 24  --   --  19* 17*   BUN 15.1  --   --  17.9 20.4   CR 0.90  --   --  1.18* 1.25*   ANIONGAP 7  --   --  12 16*   BERNARDINO 9.7  --   --  9.3 9.5   *  --   --  118* 110*   ALBUMIN 2.7*  --   --   --  2.5*   PROTTOTAL 5.6*  --   --   --  5.4*   BILITOTAL 0.3  --   --   --  0.5   ALKPHOS 86  --   --   --  75   ALT 10  --   --   --  12   AST 15  --   --   --  19

## 2022-10-07 NOTE — PROGRESS NOTES
"[facility]    Medicine Progress Note - Hospitalist Service, GOLD TEAM 12    Date of Admission:  10/4/2022    Assessment & Plan  Destiny Gomez is a 86 year old female with a PMHx significant for HTN, dementia, breast cancer s/p bilateral mastectomy and tamoxifen treatment who is presenting from her memory care facility for worsening confusion and weakness associated with low blood pressures found to have a urinary tract infection as well as bilateral staghorn caliculi and bladder outlet obstruction / bladder atony.    Today  - discontinue IVFs, PIV - patient pulling and \"cutting with butter knife\"  - change to augmentin bid for remainder of pyelonephritis treatment  - seroquel 25 mg BID PRN  - anticipate rose removal in am with TOV, Bladder scans etc     Sepsis due to Acute Pyelonephritis with Staghorn Caliculi, sepsis resolved  Acute Encephalopathy due to above, improved  Dementia   Elevated CRP likely in the setting of acute infection. No major metabolic derangments, head CT normal, no suspicious meds. History of hypervirulent klebsiella.  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  - trend     Sacral Pressure Wound  Has been noted during care checks at her [facility] care facility. Last note there graded as a stage 1 pressure ulcer without pain / drainage / tunneling.  - WOC consult     HTN  - holding PTA " losartan in setting of hypotension on presentation     Dementia  Recurrent Falls  Livers in memory care, daughter Caryn involved, several falls of recent.  - PT / OT consulted     AVA  Per daughter Caryn's report, Destiny becomes anxious often and has had panic attacks in the past. Is prescribed prozac to manage AVA.  - continue PTA prozac     EKG with right bundle branch block  EKG done in the ED incidentally noted RBBB. No previous EKGs to compare to. Troponin 25 --> 23 in ED without any concerning signs / features of cardiac symptoms.   - no chest pain, troponin low and down trending     Diet: Regular Diet Adult    DVT Prophylaxis: Enoxaparin (Lovenox) SQ  Collins Catheter: PRESENT, indication: Retention;Obstruction  Central Lines: None  Cardiac Monitoring: None  Code Status: No CPR- Do NOT Intubate      Disposition Plan           The patient's care was discussed with the Patient.    Lon Martinez DO  Hospitalist Service, GOLD TEAM [address] [place] [address]  Securely message with the Vocera Web Console (learn more here)  Text page via [facility] Paging/Directory   Please see signed in provider for up to date coverage information      Clinically Significant Risk Factors Present on Admission                    ______________________________________________________________________    Interval History   More awake, confused but pleasant and well appearing overall, seems baseline.    A 4 point review of systems evaluated including questioning patient about cardiovascular symptoms, gastrointestinal symptoms, respiratory symptoms, constitutional symptoms, and hematology / bleeding symptoms and all were negative except as noted in HPI above.    Data reviewed today: I reviewed all medications, new labs and imaging results over the last 24 hours. I personally reviewed no images or EKG's today.    Physical Exam   Vital Signs: Temp: 97.3  F (36.3  C) Temp src: Oral BP: 123/64 Pulse: 68   Resp: 18  SpO2: 99 % O2 Device: None (Room air)    Weight: 0 lbs 0 oz     General: non-distressed adult female, awake alert  HEENT: Normocephalic, atraumatic, pupils equal round reactive, membranes moist  CV: normal capillary refill, tachycardic but regular rhythm  Respiratory: Non-labored breathing, bronchovesicular lung sounds  Abdominal: soft, exuisitely tender throughout with guarding, no rebound, no rigidity, +bowel sounds  Genitourinary: no suprapubic tenderness  Musculoskeletal: normal bulk and tone  Skin: no rash, normal turgor  Neurologic: no facial droop, moving upper and lower extremities spontaneously, sensation in tact to light touch on extremities  Psychiatric: normal mood and affect    Data   Recent Labs   Lab 10/07/22  0949 10/06/22  0527 10/05/22  1326 10/05/22  0920 10/05/22  0706 10/04/22  1851   WBC 5.8 7.5 11.5*  --  11.8*  --    HGB 12.2 12.2 12.5  --  12.9  --    MCV 86 87 86  --  84  --     283 291  --  249  --    INR  --   --   --  1.20*  --   --     134*  --   --  133* 129*   POTASSIUM 3.6 3.4  --   --  4.2 3.8   CHLORIDE 104 103  --   --  102 96*   CO2 23 24  --   --  19* 17*   BUN 12.0 15.1  --   --  17.9 20.4   CR 0.77 0.90  --   --  1.18* 1.25*   ANIONGAP 9 7  --   --  12 16*   BERNARDINO 9.4 9.7  --   --  9.3 9.5   * 111*  --   --  118* 110*   ALBUMIN  --  2.7*  --   --   --  2.5*   PROTTOTAL  --  5.6*  --   --   --  5.4*   BILITOTAL  --  0.3  --   --   --  0.5   ALKPHOS  --  86  --   --   --  75   ALT  --  10  --   --   --  12   AST  --  15  --   --   --  19

## 2022-10-07 NOTE — PLAN OF CARE
Goal Outcome Evaluation:          /64 (BP Location: Right arm)   Pulse 68   Temp 97.3  F (36.3  C) (Oral)   Resp 18   SpO2 99%       [id number]  REASON FOR ADMISSION: Confusion and weakness.    Neuro: Alert and oriented to self only. Confuse and agitated at times. Pt pulled out PIV twice today.  Cardiac: SR. VSS.   Respiratory: Sating 99% on RA.  GI/: No bm. Adequate urine output via rose.  Diet/appetite: Tolerating regular diet. Eating fair.  Activity: Bedrest, turned self in bed. Pt resist care.  Pain: At acceptable level on current regimen.   Skin: No new deficits noted.  LDA's:None.  New this shift: Pt's daughter paid visit and very supportive of pt's care. Intravenous antibiotic changed to oral. Seroquel prn ordered for agitation, please use sparely.     Plan: Continue with POC. Notify primary team with changes.

## 2022-10-07 NOTE — PLAN OF CARE
/55 (BP Location: Right arm)   Pulse 70   Temp 97.6  F (36.4  C) (Oral)   Resp 16   SpO2 94%     Shift: 8661-2143  VS: VSS on RA, afebrile. Q8h  Neuro: Aox1, only oriented to self, labile mood  BG: none  Labs: pt refused am labs, will try again later this am  Respiratory: diminished LL sounds  Pain/Nausea/PRN: denies  Diet: regular, fair appetite  LDA: PIV infusing LR @ 100           rose  GI/: rose with yellow, pink output; cloudy  Skin: coccyx pressure injury, mepilex in place that's changed Q3 day per WOC change due 10/9  Mobility: Asx2 with walker  Plan: renal US today     Handoff given to following RN.

## 2022-10-08 NOTE — PROGRESS NOTES
/67 (BP Location: Right arm)   Pulse 71   Temp 97.4  F (36.3  C) (Oral)   Resp 18   SpO2 95%      Neuro: AO to self only  Neuro: Alert and oriented to self only. Confuse and agitated at times.  Cardiac: VSS.       Respiratory:on RA.  GI/: No BM. Adequate urine output via rose.  Diet/appetite: regular diet, refused meds but given po abx at 0500  Activity: Bedrest, turned self in bed. Pt resist care.  Pain: no signs of distress  Skin: No new deficits noted.  LDA's: None, provide aware  Plan: awaiting discharge, [facility] can't take pt back on the weekends. Continue POC

## 2022-10-08 NOTE — PROGRESS NOTES
[facility]    Medicine Progress Note - Hospitalist Service, GOLD TEAM 12    Date of Admission:  10/4/2022    Assessment & Plan  Destiny Gomez is a 86 year old female with a PMHx significant for HTN, dementia, breast cancer s/p bilateral mastectomy and tamoxifen treatment who is presenting from her memory care facility for worsening confusion and weakness associated with low blood pressures found to have a urinary tract infection as well as bilateral staghorn caliculi and bladder outlet obstruction / bladder atony.    Today  - change to augmentin bid for remainder of pyelonephritis treatment  - seroquel 25 mg BID PRN  - remove PARSONS, Bladder scan qshift, straight cath for PVR > 350 mls     Sepsis due to Acute Pyelonephritis with Staghorn Caliculi, sepsis resolved  Acute Encephalopathy due to above, improved  Dementia   Elevated CRP likely in the setting of acute infection. No major metabolic derangments, head CT normal, no suspicious meds. History of hypervirulent klebsiella.  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  - trend     Sacral Pressure Wound  Has been noted during care checks at her memory care facility. Last note there graded as a stage 1 pressure ulcer without pain / drainage / tunneling.  - WOC consult     HTN  - holding PTA losartan in setting of hypotension on presentation     Dementia  Recurrent Falls  Livers in memory care, daughter Caryn involved, several falls  of recent.  - PT / OT consulted     AVA  Per daughter Caryn's report, Destiny becomes anxious often and has had panic attacks in the past. Is prescribed prozac to manage AVA.  - continue PTA prozac     EKG with right bundle branch block  EKG done in the ED incidentally noted RBBB. No previous EKGs to compare to. Troponin 25 --> 23 in ED without any concerning signs / features of cardiac symptoms.   - no chest pain, troponin low and down trending     Diet: Regular Diet Adult    DVT Prophylaxis: Enoxaparin (Lovenox) SQ  Collins Catheter: PRESENT, indication: Retention;Obstruction  Central Lines: None  Cardiac Monitoring: None  Code Status: No CPR- Do NOT Intubate      Disposition Plan           The patient's care was discussed with the Patient.    Lon Martinez DO  Hospitalist Service, GOLD TEAM [address] [place] [address]  Securely message with the Vocera Web Console (learn more here)  Text page via [facility] Paging/Directory   Please see signed in provider for up to date coverage information      Clinically Significant Risk Factors Present on Admission                    ______________________________________________________________________    Interval History   Alert, confused, dementia, complaint of sinusitis states ongoing long term sinus issues, not other physical complaints.    A 4 point review of systems evaluated including questioning patient about cardiovascular symptoms, gastrointestinal symptoms, respiratory symptoms, constitutional symptoms, and hematology / bleeding symptoms and all were negative except as noted in HPI above.    Data reviewed today: I reviewed all medications, new labs and imaging results over the last 24 hours. I personally reviewed no images or EKG's today.    Physical Exam   Vital Signs: Temp: 97.6  F (36.4  C) Temp src: Oral BP: 132/66 Pulse: 77   Resp: 16 SpO2: 98 % O2 Device: None (Room air)    Weight: 0 lbs 0 oz     General: non-distressed adult female,  awake alert  HEENT: Normocephalic, atraumatic, pupils equal round reactive, membranes moist  CV: normal capillary refill, tachycardic but regular rhythm  Respiratory: Non-labored breathing, bronchovesicular lung sounds  Abdominal: soft, exuisitely tender throughout with guarding, no rebound, no rigidity, +bowel sounds  Genitourinary: no suprapubic tenderness  Musculoskeletal: normal bulk and tone  Skin: no rash, normal turgor  Neurologic: no facial droop, moving upper and lower extremities spontaneously, sensation in tact to light touch on extremities  Psychiatric: normal mood and affect    Data   Recent Labs   Lab 10/08/22  0755 10/07/22  0949 10/06/22  0527 10/05/22  1326 10/05/22  0920 10/05/22  0706 10/04/22  1851   WBC 5.6 5.8 7.5   < >  --    < >  --    HGB 11.6* 12.2 12.2   < >  --    < >  --    MCV 87 86 87   < >  --    < >  --     269 283   < >  --    < >  --    INR  --   --   --   --  1.20*  --   --     136 134*  --   --    < > 129*   POTASSIUM 3.8 3.6 3.4  --   --    < > 3.8   CHLORIDE 105 104 103  --   --    < > 96*   CO2 25 23 24  --   --    < > 17*   BUN 10.6 12.0 15.1  --   --    < > 20.4   CR 0.77 0.77 0.90  --   --    < > 1.25*   ANIONGAP 6* 9 7  --   --    < > 16*   BERNARDINO 9.5 9.4 9.7  --   --    < > 9.5   * 105* 111*  --   --    < > 110*   ALBUMIN  --   --  2.7*  --   --   --  2.5*   PROTTOTAL  --   --  5.6*  --   --   --  5.4*   BILITOTAL  --   --  0.3  --   --   --  0.5   ALKPHOS  --   --  86  --   --   --  75   ALT  --   --  10  --   --   --  12   AST  --   --  15  --   --   --  19    < > = values in this interval not displayed.

## 2022-10-08 NOTE — PLAN OF CARE
Goal Outcome Evaluation:    Plan of Care Reviewed With: patient     Overall Patient Progress: no change       [id number]  Status: admitted 10/4 with UTI and worsening confusion. Hx demenita, breast ca.   Vitals: VSS on RA  Neuros: Alert, oriented to self. Pleasant and cooperative.   IV: no IV, 10/7 progress note by Dr Martinez indicates this is ok  Diet: regular diet, had 50% breakfast and lunch  Bowel status: BM this evening after having miralax  : rose removed at 3:30pm. DTV by 9:30pm. Orders to bladder scan and straight cath if >350ml  Skin: sacral wound, drsg intact  Pain: denies  Activity: independent in bed, up with assist of 2 gb and walker  Social: daughter Caryn visiting  Plan: discharge to memory care on monday

## 2022-10-09 NOTE — PLAN OF CARE
"Goal Outcome Evaluation:         /65   Pulse 72   Temp 97.9  F (36.6  C) (Oral)   Resp 18   Ht 1.651 m (5' 5\")   Wt 67 kg (147 lb 11.3 oz)   SpO2 95%   BMI 24.58 kg/m           [id number]  Reason for admission: Confusion and weakness.    Neuro: Alert and oriented to self only. Bed and chair alarms in use.  Cardiac: SR. VSS.   Respiratory: Sating 95%on RA.  GI/: Adequate urine output. BM X1  Diet/appetite: Tolerating regular diet. Eating well.  Activity:  Assist of one with a walker up to chair and bathroom.  Pain: At acceptable level on current regimen.   Skin: No new deficits noted.  LDA's:None. Pt pulled PIV twice on 10/07.  New this shift: Pt much calmer than previous shift. She allowed care. Bladder scan of 350, straight for cath 400 ml at 1400.    Plan: Continue with POC. Notify primary team with changes.  "

## 2022-10-09 NOTE — PROVIDER NOTIFICATION
Notified provider about pt BS amount and refusal with agitation and violence toward staff to be straight cath.

## 2022-10-09 NOTE — PROGRESS NOTES
[facility]    Medicine Progress Note - Hospitalist Service, GOLD TEAM 12    Date of Admission:  10/4/2022    Assessment & Plan  Destiny Gomez is a 86 year old female with a PMHx significant for HTN, dementia, breast cancer s/p bilateral mastectomy and tamoxifen treatment who is presenting from her memory care facility for worsening confusion and weakness associated with low blood pressures found to have a urinary tract infection as well as bilateral staghorn caliculi and bladder outlet obstruction / bladder atony.    Today  - Augmentin to complete pyelonephritis treatment  - seroquel 25 mg BID PRN     Sepsis due to Acute Pyelonephritis with Staghorn Caliculi, sepsis resolved  Acute Encephalopathy due to above, improved  Dementia   Elevated CRP likely in the setting of acute infection. No major metabolic derangments, head CT normal, no suspicious meds. History of hypervirulent klebsiella.  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  - trend     Sacral Pressure Wound  Has been noted during care checks at her memory care facility. Last note there graded as a stage 1 pressure ulcer without pain / drainage / tunneling.  - WOC consult     HTN  - holding PTA losartan in setting of hypotension on presentation     Dementia  Recurrent Falls  Livers in memory care, daughter Caryn involved, several falls of recent.  - PT / OT consulted     AVA  Per daughter Caryn's report, Destiny becomes anxious  often and has had panic attacks in the past. Is prescribed prozac to manage AVA.  - continue PTA prozac     EKG with right bundle branch block  EKG done in the ED incidentally noted RBBB. No previous EKGs to compare to. Troponin 25 --> 23 in ED without any concerning signs / features of cardiac symptoms.   - no chest pain, troponin low and down trending     Diet: Regular Diet Adult    DVT Prophylaxis: Enoxaparin (Lovenox) SQ  Collins Catheter: Not present  Central Lines: None  Cardiac Monitoring: None  Code Status: No CPR- Do NOT Intubate      Disposition Plan           The patient's care was discussed with the Patient.    Lon Martinez DO  Hospitalist Service, GOLD TEAM [address] [place] [address]  Securely message with the Vocera Web Console (learn more here)  Text page via [facility] Paging/Directory   Please see signed in provider for up to date coverage information      Clinically Significant Risk Factors Present on Admission                    ______________________________________________________________________    Interval History   Doing well much more alert and conversant today, did need some haldol overnight for agitation but otherwise doing well no physical complaints.    A 4 point review of systems evaluated including questioning patient about cardiovascular symptoms, gastrointestinal symptoms, respiratory symptoms, constitutional symptoms, and hematology / bleeding symptoms and all were negative except as noted in HPI above.    Data reviewed today: I reviewed all medications, new labs and imaging results over the last 24 hours. I personally reviewed no images or EKG's today.    Physical Exam   Vital Signs: Temp: 97.9  F (36.6  C) Temp src: Oral BP: 135/65 Pulse: 72   Resp: 18 SpO2: 95 % O2 Device: None (Room air)    Weight: 147 lbs 11.33 oz     General: non-distressed adult female, awake alert  HEENT: Normocephalic, atraumatic, pupils equal round reactive, membranes  moist  CV: normal capillary refill, tachycardic but regular rhythm  Respiratory: Non-labored breathing, bronchovesicular lung sounds  Abdominal: soft, exuisitely tender throughout with guarding, no rebound, no rigidity, +bowel sounds  Genitourinary: no suprapubic tenderness  Musculoskeletal: normal bulk and tone  Skin: no rash, normal turgor  Neurologic: no facial droop, moving upper and lower extremities spontaneously, sensation in tact to light touch on extremities  Psychiatric: normal mood and affect    Data   Recent Labs   Lab 10/09/22  1341 10/08/22  0755 10/07/22  0949 10/06/22  0527 10/05/22  1326 10/05/22  0920 10/05/22  0706 10/04/22  1851   WBC 8.3 5.6 5.8 7.5   < >  --    < >  --    HGB 12.8 11.6* 12.2 12.2   < >  --    < >  --    MCV 88 87 86 87   < >  --    < >  --     265 269 283   < >  --    < >  --    INR  --   --   --   --   --  1.20*  --   --    NA  --  136 136 134*  --   --    < > 129*   POTASSIUM  --  3.8 3.6 3.4  --   --    < > 3.8   CHLORIDE  --  105 104 103  --   --    < > 96*   CO2  --  25 23 24  --   --    < > 17*   BUN  --  10.6 12.0 15.1  --   --    < > 20.4   CR  --  0.77 0.77 0.90  --   --    < > 1.25*   ANIONGAP  --  6* 9 7  --   --    < > 16*   BERNARDINO  --  9.5 9.4 9.7  --   --    < > 9.5   GLC  --  114* 105* 111*  --   --    < > 110*   ALBUMIN  --   --   --  2.7*  --   --   --  2.5*   PROTTOTAL  --   --   --  5.6*  --   --   --  5.4*   BILITOTAL  --   --   --  0.3  --   --   --  0.5   ALKPHOS  --   --   --  86  --   --   --  75   ALT  --   --   --  10  --   --   --  12   AST  --   --   --  15  --   --   --  19    < > = values in this interval not displayed.

## 2022-10-09 NOTE — PROGRESS NOTES
[phone]  Pt alert to self only, VSS, on RA. Pt combative with staff with cares, refused to attempt to get out of bed to void. attempted several times but became more agitated. One time PRN haldol IM given, straight cath pt of 825cc No IV, provide aware. Denies pain. Independent in bed. discharge to memory care on Monday. Continue POC

## 2022-10-10 NOTE — PROGRESS NOTES
Tried to call Daughter several times, over weekend, this morning, and now but unable to reach. Will message Dr Urban re hospitalization and follow-up since he has been seeing her outpatient    Yayo Lilly MD

## 2022-10-10 NOTE — PLAN OF CARE
"[id number]  /76 (BP Location: Right arm)   Pulse 92   Temp 97.6  F (36.4  C) (Oral)   Resp 18   Ht 1.651 m (5' 5\")   Wt 67 kg (147 lb 11.3 oz)   SpO2 93%   BMI 24.58 kg/m    New this shift: AM bladder scan volume 534mL, pt able to void, PRV 302mL. Wound care done per order, mepilex in place.    A&Ox1, disoriented to time/place/situation, confused. Bed alarm on for safety. VSS on room air. Assist of 1 with gait belt/walker. Denies nausea/pain. Regular diet, needs assistance ordering. Voids spontaneously. BM x3 today. No PIV, team aware.    Plan of care: Plan to discharge to assisted living 10/11 @ 1030 pending straight cath needs    Goal Outcome Evaluation:  Plan of Care Reviewed With: patient  Overall Patient Progress: no changeOverall Patient Progress: no change  "

## 2022-10-10 NOTE — DISCHARGE SUMMARY
[facility]  Hospitalist Discharge Summary      Date of Admission:  10/4/2022  Date of Discharge:  10/10/2022  Discharging Provider: Lon Martinez DO  Discharge Service: Hospitalist Service, GOLD TEAM 12    Discharge Diagnoses      Sepsis due to Acute Pyelonephritis with Staghorn Caliculi, sepsis resolved  Acute Encephalopathy due to above, improved  Dementia   Sinusitis  Cough  Acute Kidney Injury  Sacral Pressure Wound  HTN  Dementia  Recurrent Falls  AVA  EKG with right bundle branch block    Follow-ups Needed After Discharge   Follow-up Appointments     Follow Up and recommended labs and tests      Follow up with primary care provider, Jessica Perez, within 7 days to   evaluate medication change.  No follow up labs or test are needed.    Follow up with urology a referral has been made and they will call to   schedule and appointment.             Unresulted Labs Ordered in the Past 30 Days of this Admission     No orders found from 9/4/2022 to 10/5/2022.      These results will be followed up by NA    Discharge Disposition   Discharged to home  Condition at discharge: Stable    Hospital Course   Destiny Gomez is a 86 year old female with a PMHx significant for HTN, dementia, breast cancer s/p bilateral mastectomy and tamoxifen treatment who is presenting from her memory care facility for worsening confusion and weakness associated with low blood pressures found to have a urinary tract infection as well as bilateral staghorn caliculi and bladder outlet obstruction / bladder atony.     Sepsis due to Acute Pyelonephritis with Staghorn Caliculi, sepsis resolved  Acute Encephalopathy due to above, improved  Dementia   Elevated CRP likely in the setting of acute infection. No major metabolic derangments, head CT normal, no suspicious meds. History of hypervirulent klebsiella.  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  - resolved     Sacral Pressure Wound  Has been noted during care checks at her memory care facility. Last note there graded as a stage 1 pressure ulcer without pain / drainage / tunneling.  - WOC consult     HTN  - resume losartan     Dementia  Recurrent Falls  Livers in memory care, daughter Caryn involved, several falls of recent.  - PT / OT      AVA  Per daughter Caryn's report, Destiny becomes anxious often and has had panic attacks in the past. Is prescribed prozac to manage AVA.  - continue PTA prozac     EKG with right bundle branch block  EKG done in the ED incidentally noted RBBB. No previous EKGs to compare to. Troponin 25 --> 23 in ED without any concerning signs / features of cardiac symptoms.   - no chest pain, troponin low and down trending    Consultations This Hospital Stay   NURSING TO CONSULT FOR VASCULAR ACCESS CARE IP CONSULT  PHYSICAL THERAPY ADULT IP CONSULT  OCCUPATIONAL THERAPY ADULT IP CONSULT  WOUND OSTOMY CONTINENCE NURSE  IP CONSULT  NURSING TO CONSULT FOR VASCULAR ACCESS CARE IP CONSULT  UROLOGY IP CONSULT  NURSING TO CONSULT FOR VASCULAR ACCESS CARE IP CONSULT  NURSING TO CONSULT FOR VASCULAR ACCESS CARE IP CONSULT  NURSING TO CONSULT FOR VASCULAR ACCESS CARE IP CONSULT  NURSING TO CONSULT FOR VASCULAR ACCESS CARE IP CONSULT    Code Status   No CPR- Do NOT Intubate    Time Spent on this Encounter   I, Lon Martinez DO, personally saw the patient today and spent less than or equal to 30 minutes discharging this patient.       Lon Martinez DO  [facility]  [address] [address]  Phone: [phone]  ______________________________________________________________________    Physical Exam   Vital Signs: Temp: 97.8  F (36.6  C) Temp src: Oral BP: 137/74 Pulse: 76   Resp: 18 SpO2: 96 % O2 Device: None (Room air)    Weight: 147 lbs 11.33 oz  General: non-distressed adult female, awake alert  HEENT: Normocephalic, atraumatic, pupils equal round reactive, membranes moist  CV: normal capillary refill, tachycardic but regular rhythm  Respiratory: Non-labored breathing, bronchovesicular lung sounds  Abdominal: soft, exuisitely tender throughout with guarding, no rebound, no rigidity, +bowel sounds  Genitourinary: no suprapubic tenderness  Musculoskeletal: normal bulk and tone  Skin: no rash, normal turgor  Neurologic: no facial droop, moving upper and lower extremities spontaneously, sensation in tact to light touch on extremities  Psychiatric: normal mood and affect, dementia       Primary Care Physician   Jessica Perez    Discharge Orders      Home Care Referral      Reason for your hospital stay    Urinary tract infection     Activity    Your activity upon discharge: activity as tolerated and no driving for today     Follow Up and recommended labs and tests    Follow up with primary care provider, Jessica Perez, within 7 days to evaluate medication change.  No follow up labs or test are needed.    Follow up with urology a referral has been made and they will call to schedule and appointment.     No CPR- Do NOT Intubate     Diet    Follow this diet upon discharge: Orders Placed This Encounter      Regular Diet Adult       Significant Results and Procedures   Most Recent 3 CBC's:Recent Labs   Lab Test 10/10/22  0551 10/09/22  1341 10/08/22  0755   WBC 7.2 8.3 5.6   HGB 12.6 12.8 11.6*   MCV 88 88 87    279 265     Most Recent 3 BMP's:Recent Labs   Lab Test 10/10/22  0551 10/09/22  1341 10/08/22  0755   * 136 136    POTASSIUM 3.9 4.1 3.8   CHLORIDE 103 103 105   CO2 21* 24 25   BUN 11.6 12.9 10.6   CR 0.79 0.83 0.77   ANIONGAP 11 9 6*   BERNARDINO 10.1 10.0 9.5   * 107* 114*     Most Recent 2 LFT's:Recent Labs   Lab Test 10/06/22  0527 10/04/22  1851   AST 15 19   ALT 10 12   ALKPHOS 86 75   BILITOTAL 0.3 0.5       Discharge Medications   Current Discharge Medication List      START taking these medications    Details   amoxicillin-clavulanate (AUGMENTIN) 875-125 MG tablet Take 1 tablet by mouth every 12 hours for 9 days  Qty: 17 tablet, Refills: 0    Associated Diagnoses: Pyelonephritis, acute         CONTINUE these medications which have NOT CHANGED    Details   acetaminophen (TYLENOL) 500 MG tablet Take 500-1,000 mg by mouth every 6 hours as needed for mild pain      FLUoxetine (PROZAC) 20 MG capsule Take 1 capsule (20 mg) by mouth daily  Qty: 90 capsule, Refills: 3    Associated Diagnoses: AVA (generalized anxiety disorder)      fluticasone (FLONASE) 50 MCG/ACT nasal spray Spray 2 sprays into both nostrils daily      losartan (COZAAR) 100 MG tablet TAKE 1 TABLET BY MOUTH ONCE DAILY  Qty: 90 tablet, Refills: 2    Comments: CYCLE FILL REQUEST FOR CYCLE THAT STARTS 09/29/2022  Associated Diagnoses: Essential hypertension      QUEtiapine (SEROQUEL) 25 MG tablet TAKE 0.5 TABLETS (12.5 MG) BY MOUTH 3 TIMES DAILY AS NEEDED (ANXIETY/AGITATION)  Qty: 90 tablet, Refills: 1    Associated Diagnoses: AVA (generalized anxiety disorder)      estradiol (ESTRACE VAGINAL) 0.1 MG/GM vaginal cream Apply small amount to the vaginal opening and urethra M, W, F @ h.s.  Qty: 42.5 g, Refills: 3    Associated Diagnoses: Urinary frequency           Allergies   No Known Allergies

## 2022-10-10 NOTE — PLAN OF CARE
Assumed care from 1900 to 0730  Neuro: a&o to self only, confused at time, situation and place. Bed alarm in place.   Respiratory: Sats>94% on RA.   Cardiac: Apical pulse regular.   GI/: Bowel sounds present x4. Had a medium BM overnight. Voided adequately to the bathroom. PVR was 155cc at 0530.  Skin/Drains/Incisions: Mepilex in place to coccyx prophylactically.  Lines: No IV and team aware.   Pain: Rated pain 0/10 with no  s/sx of pain noted.   Diet: Regular.   Labs: No abnormal labs this shift.   Electrolytes Replacement: n/a.   Activity Level: Up with Ax1 with GB/walker.   Plan: Continue to monitor for falls.

## 2022-10-10 NOTE — PROGRESS NOTES
Care Management Follow Up    Length of Stay (days): 6    Expected Discharge Date: 10/11/2022     Concerns to be Addressed: discharge planning     Patient plan of care discussed at interdisciplinary rounds: Yes    Anticipated Discharge Disposition: Assisted Living     Anticipated Discharge Services: None  Anticipated Discharge DME:  (Per pt son hospital bed is being delivered)    Patient/family educated on Medicare website which has current facility and service quality ratings: yes  Education Provided on the Discharge Plan:    Patient/Family in Agreement with the Plan: yes    Referrals Placed by CM/SW: External Care Coordination, Homecare    Additional Information:  Anticipate pt will discharge back to halfway on Tuesday 10/11.  Pt has required straight cath which halfway is unable to provide.  Writer notified by Reyna COATES that pt family is requesting discharge transportation be arranged.  Writer spoke with pt son Gaetano via phone [phone].  Per pt son a hospital bed is being delivered to pt halfway today.   Agreed to arrange discharge transportation for tomorrow.  Pt son notes no concerns or questions at this time.    [facility] Stretcher Transport confirmed for 10:30 a.m. on Tuesday 10/11.  Stretcher transport d/t hx of dementia and pt being alert and oriented to self only.     VM left for [facility] [phone] with update on discharge plan.     Discharge Destination:   The [address], [address]  P: [phone]  F: [phone]    Alena Shore, RN BSN, PHN, ACM-RN  7A RN Care Coordinator  Phone: [phone]  Pager [phone]    To contact the weekend RNCC  [place] (0800 - 1630) Saturday and Sunday    Units: 4A, 4C, 4E, 5A and 5B- Pager 1: [phone]    Units: 6A, 6B, 6C, 6D- Pager 2: [phone]    Units: 7A, 7B, 7C, 7D, and 5C-Pager 3: [phone]    [facility] ([phone]) Saturday and Sunday    Units: 5 Ortho, 8A, 10 ICU, & Pediatric  Units-Pager 4: [phone]    10/10/2022 11:08 AM

## 2022-10-11 NOTE — PLAN OF CARE
"/85 (BP Location: Right arm)   Pulse 87   Temp 97.9  F (36.6  C) (Oral)   Resp 18   Ht 1.651 m (5' 5\")   Wt 67 kg (147 lb 11.3 oz)   SpO2 96%   BMI 24.58 kg/m      Shift: 3813-0691  Isolation Status: none  VS: stable on room air, afebrile  Neuro: Aox1 (self)  Behaviors: calm, cooperative  BG: none  Respiratory: WDL  Cardiac: WDL  Pain/Nausea: denies pain/nausea  Diet: regular  IV Access: none  Infusion(s): none  Lines/Drains: none  GI/: BM 10/10, oliguric - bladder scan Qshift. Pt had 600mL retention. Straight cath attempted by myself followed by 2 other nurse attempts. Lidocaine gel ordered. Hand off given to oncoming nurse regarding need to cath  Skin: WDL  Mobility: Ax1 + gait belt + walker  Plan: possible discharge tomorrow     "

## 2022-10-11 NOTE — PROVIDER NOTIFICATION
Text page to provider Loraine [phone]   Destiny Gomez on 7A room 6-1  Pt has > 400cc in her bladder.  She has been unsuccessfully straight cathed 4 times since evenings.  Should we get urology involved?   *[phone]   Clary

## 2022-10-11 NOTE — PROGRESS NOTES
Care Management Follow Up    Length of Stay (days): 7    Expected Discharge Date: 10/11/2022     Concerns to be Addressed: discharge planning     Patient plan of care discussed at interdisciplinary rounds: Yes    Anticipated Discharge Disposition: Assisted Living     Anticipated Discharge Services: None  Anticipated Discharge DME:  (Per pt son hospital bed is being delivered)    Patient/family educated on Medicare website which has current facility and service quality ratings: yes  Education Provided on the Discharge Plan:    Patient/Family in Agreement with the Plan: yes    Referrals Placed by CM/SW: External Care Coordination, Homecare    Additional Information:  Notified by LINCOLN Delgado Charge RN that pt continues to have issues with urinary retention.  Night shift RN's unable to straight cath patient.   Awaiting response from provider regarding possible urology consult/plan for patient.    Writer spoke with Dee COATES [facility] [phone] regarding pt situation.  Dee confirmed that if patient is discharged with a rose catheter [facility] staff can empty the rose, change it over to a leg bag for day use but would need home care RN to assist with rose changes.   Reviewed awaiting confirmation from provider team on plan for patient.   As previously noted [facility] has accepted for home RN, PT, OT services.  Reviewed above with Sandy COATES.  Paged provider with above information.  Possible discharge to [facility] today,  [facility] St[facility] Transport at 1030 a.m. pending provider plan for patient.    0855: Spoke with Braden Wells 12. Awaiting urology recommendations. Stretcher Transport moved to 1 p.m.      1100: Pt having rose placed prior to discharge.  Met with pt and her daughter.  Discharge IMM reviewed/signed and copy given.  No concerns regarding discharge back to [facility] today.  Pt daughter aware of plan for urology follow up.  Email update sent [facility].  Faxed final discharge orders  to CHARITO Price RN. snf.    Discharge Destination:   The [address], [address]  P: [phone]  F: [phone]    Alena Shore, RN BSN, PHN, ACM-RN  7A RN Care Coordinator  Phone: [phone]  Pager [phone]    To contact the weekend RNCC  [place] (0800 - 1630) Saturday and Sunday    Units: 4A, 4C, 4E, 5A and 5B- Pager 1: [phone]    Units: 6A, 6B, 6C, 6D- Pager 2: [phone]    Units: 7A, 7B, 7C, 7D, and 5C-Pager 3: [phone]    [facility] ([phone]) Saturday and Sunday    Units: 5 Ortho, 8A, 10 ICU, & Pediatric Units-Pager 4: [phone]    10/11/2022 8:52 AM

## 2022-10-11 NOTE — DISCHARGE SUMMARY
[facility]  Hospitalist Discharge Summary      Date of Admission:  10/4/2022  Date of Discharge:  10/11/2022  Discharging Provider: Stephan Kingston MD  Discharge Service: Hospitalist Service, GOLD TEAM 12    Discharge Diagnoses     Sepsis due to Acute Pyelonephritis with Staghorn Caliculi, sepsis resolved  Acute Encephalopathy due to above, improved  Dementia   Sinusitis  Cough  Acute Kidney Injury  Sacral Pressure Wound  HTN  Dementia  Recurrent Falls  AVA  EKG with right bundle branch block    Follow-ups Needed After Discharge   Follow-up Appointments     Follow Up and recommended labs and tests      Follow up with primary care provider, Jessica Perez, within 7 days to   evaluate medication change.  No follow up labs or test are needed.    Follow up with urology a referral has been made and they will call to   schedule and appointment.             Unresulted Labs Ordered in the Past 30 Days of this Admission     No orders found from 9/4/2022 to 10/5/2022.      These results will be followed up by     Discharge Disposition   Discharged to assisted living  Condition at discharge: Stable    Hospital Course   Destiny Gomez is a 86 year old female with a PMHx significant for HTN, dementia, breast cancer s/p bilateral mastectomy and tamoxifen treatment who is presenting from her memory care facility for worsening confusion and weakness associated with low blood pressures found to have a urinary tract infection as well as bilateral staghorn caliculi and bladder outlet obstruction / bladder atony.     Sepsis due to Acute Pyelonephritis with Staghorn Caliculi, sepsis resolved  Acute Encephalopathy due to above, improved  Dementia   Elevated CRP likely in the setting of acute infection. No major metabolic derangments, head CT normal, no suspicious meds. History of hypervirulent klebsiella.  - Augmentin (14 total days total Abx duration, 9 additional days at  discharge)  - Urology Consulted -  best options for method and timing to be discussed as outpatient  - Seroquel 25 mg BID PRN agitation  - Required rose at discharge with plan for nursing changes q1-2 weeks, outpatient urology follow up.     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  - resolved     Sacral Pressure Wound  Has been noted during care checks at her memory care facility. Last note there graded as a stage 1 pressure ulcer without pain / drainage / tunneling.  - WOC consult     HTN  - resume losartan     Dementia  Recurrent Falls  Livers in memory care, daughter Caryn involved, several falls of recent.  - PT / OT      AVA  Per daughter Caryn's report, Destiny becomes anxious often and has had panic attacks in the past. Is prescribed prozac to manage AVA.  - continue PTA prozac     EKG with right bundle branch block  EKG done in the ED incidentally noted RBBB. No previous EKGs to compare to. Troponin 25 --> 23 in ED without any concerning signs / features of cardiac symptoms.   - no chest pain, troponin low and down trending    Consultations This Hospital Stay   NURSING TO CONSULT FOR VASCULAR ACCESS CARE IP CONSULT  PHYSICAL THERAPY ADULT IP CONSULT  OCCUPATIONAL THERAPY ADULT IP CONSULT  WOUND OSTOMY CONTINENCE NURSE  IP CONSULT  NURSING TO CONSULT FOR VASCULAR ACCESS CARE IP CONSULT  UROLOGY IP CONSULT  NURSING TO CONSULT FOR VASCULAR ACCESS CARE IP CONSULT  NURSING TO CONSULT FOR VASCULAR ACCESS CARE IP CONSULT  NURSING TO CONSULT FOR VASCULAR ACCESS CARE IP CONSULT  NURSING TO CONSULT FOR VASCULAR ACCESS CARE IP CONSULT    Code Status   No CPR- Do NOT Intubate    Time Spent on this Encounter   I, Stephan Kingston MD, personally saw the  patient today and spent less than or equal to 30 minutes discharging this patient.       Stephan Kingston MD  [facility] [address] [facility]  [address] [place] [id number]  Phone: [phone]  ______________________________________________________________________    Physical Exam   Vital Signs: Temp: 97.7  F (36.5  C) Temp src: Oral BP: 118/57 Pulse: 86   Resp: 18 SpO2: 97 % O2 Device: None (Room air)    Weight: 147 lbs 11.33 oz  General Appearance: NAD, pleasant, interactive, confused  Respiratory: CTAB, no signs of respiratory distress  Cardiovascular: RRR no MRG  GI: Soft ND, no masses  Skin: WWP       Primary Care Physician   Jessica Perez    Discharge Orders      Home Care Referral      Adult Urology  Referral      Reason for your hospital stay    Urinary tract infection     Activity    Your activity upon discharge: activity as tolerated and no driving for today     Follow Up and recommended labs and tests    Follow up with primary care provider, Jessica Perez, within 7 days to evaluate medication change.  No follow up labs or test are needed.    Follow up with urology a referral has been made and they will call to schedule and appointment.     No CPR- Do NOT Intubate     Diet    Follow this diet upon discharge: Orders Placed This Encounter      Regular Diet Adult       Significant Results and Procedures   Most Recent 3 CBC's:Recent Labs   Lab Test 10/11/22  0717 10/10/22  0551 10/09/22  1341   WBC 6.3 7.2 8.3   HGB 12.3 12.6 12.8   MCV 88 88 88    282 279     Most Recent 3 BMP's:Recent Labs   Lab Test 10/11/22  0717 10/10/22  0551 10/09/22  1341   * 135* 136   POTASSIUM 3.7 3.9 4.1   CHLORIDE 103 103 103   CO2 21* 21* 24   BUN 12.4 11.6 12.9   CR 0.98* 0.79 0.83   ANIONGAP 10 11 9   BERNARDINO 9.9 10.1 10.0   * 130* 107*     Most Recent 2 LFT's:Recent Labs   Lab Test 10/06/22  0527 10/04/22  1851   AST 15 19   ALT 10 12   ALKPHOS 86 75   BILITOTAL  0.3 0.5     Most Recent 3 INR's:Recent Labs   Lab Test 10/05/22  0920   INR 1.20*   ,   Results for orders placed or performed during the hospital encounter of 10/04/22   CT Head w/o Contrast    Narrative    EXAM: CT HEAD W/O CONTRAST  10/4/2022 6:33 PM     HISTORY:  weakness       COMPARISON:  Brain MRI 4/10/2018    TECHNIQUE: Using multidetector thin collimation helical acquisition  technique, axial, coronal and sagittal CT images from the skull base  to the vertex were obtained without intravenous contrast.   (topogram) image(s) also obtained and reviewed.    FINDINGS:  No intracranial hemorrhage, mass effect, or midline shift. No acute  loss of gray-white matter differentiation in the cerebral hemispheres.  Ventricles are proportionate to the cerebral sulci. Clear basal  cisterns. Mild diffuse cerebral volume loss. Patchy periventricular  hypoattenuation, consistent chronic small vessel ischemic disease.    The bony calvaria and the bones of the skull base are normal. The  visualized portions of the paranasal sinuses and mastoid air cells are  clear. Grossly normal orbits.       Impression    IMPRESSION: No acute intracranial pathology.     FLIP LADD MD         SYSTEM ID:  [id number]   CT Abdomen Pelvis w Contrast    Narrative    Examination: CT ABDOMEN PELVIS W CONTRAST, 10/4/2022 6:33 PM    Technique:  Helical CT images from the lung bases through the  symphysis pubis were obtained with contrast.  Coronal reformatted  images were generated at a workstation for further assessment.    Contrast:  90 mL Isovue-370 IV    Comparison: Abdomen and pelvis CT 6/10/2022    History: pelvic wounds, weakness    Findings:    Abdomen and pelvis:   Liver: No suspicious liver lesions. Simple attenuating multiloculated  cyst in the left hepatic lobe.  Gallbladder: No gallstones. No evidence of acute cholecystitis.  Spleen: Normal size.  Pancreas: No suspicious pancreatic lesions. The pancreatic duct is  not  dilated.  Adrenal glands: No adrenal nodules.  Urinary system: Unchanged staghorn calculus in the right renal  collecting system measuring 15 x 13 mm and resulting in moderate  hydronephrosis and hydroureter with dilation of the anterior  interpolar region calyces. Additional scattered smaller stones  throughout the right kidney are unchanged. Unchanged stable calculus  in the inferior pole of the left kidney measuring 14 x 7 mm. Moderate  left hydronephrosis and hydroureter without obstructing mass or stone  within the left ureter or within the urinary bladder. Scattered  hypodense cysts in both kidneys. Circumferential urinary bladder wall  thickening with perivesicular stranding.  Reproductive organs: Uterus and both ovaries are within normal limits.  Bowel: Prominent duodenal diverticulum arising from the second portion  of the duodenum. Colonic diverticulosis without evidence for acute  diverticulitis. Multiple nondilated fluid-filled loops of small bowel  throughout the abdomen and pelvis. No abnormal bowel wall thickening  or enhancement. The appendix is unremarkable. Small fat-containing  right femoral hernia.  Lymph nodes: No retroperitoneal, mesenteric, or pelvic  lymphadenopathy.  Fluid: No free fluid within the abdomen.  Vessels: No infrarenal aortic aneurysm. The portal, superior  mesenteric, and splenic veins are patent.    Lung bases: No consolidation or pleural effusion.    Bones and soft tissues: Sacrococcygeal soft tissue wound superficial  to the sacrum and coccyx with mild overlying skin thickening and  subcutaneous stranding without appreciable fluid collection. No  abnormal sclerotic or lucent lesion in the sacrum or coccyx to suggest  osteomyelitis. No suspicious osseous lesions. Rightward convex  curvature to the lumbar spine with advanced degenerative change to the  lumbar spine. Probable Schmorl node deformity involving the superior  endplate of L4 with unchanged lucency extending from  this probable  Schmorl's node deformity to the posterior aspect of the vertebral  body. Grade 1 retrolisthesis of L1 on L2 and L2 on L3.     Scattered subcutaneous emphysema in the visualized right arm likely  related to recent peripheral IV catheter placement.      Impression    Impression:   1.  Diffuse urinary bladder wall thickening with perivesicular  stranding concerning for acute cystitis.  2.  Moderate bilateral hydronephrosis and hydroureter and ureteric  enhancement, without obstructing stone in either ureter or within the  urinary bladder. Finding raises the question of bilateral ureteral  reflux and ascending infection.   3.  Unchanged focal moderate hydronephrosis in the anterior interpolar  collecting system and the right kidney due to the staghorn calculus.  4.  Additional staghorn calculi in both kidneys and scattered smaller  nonobstructing stones in both kidneys.  5.  Soft tissue wound at the sacrococcygeal region with adjacent  subcutaneous inflammatory change without drainable fluid collection.  No CT evidence for osteomyelitis.  6.  Colonic diverticulosis without evidence for acute diverticulitis.    I have personally reviewed the examination and initial interpretation  and I agree with the findings.    SAUL CAREY MD         SYSTEM ID:  [id number]   XR Chest 1 View    Narrative    EXAM: XR CHEST 1 VIEW  10/4/2022 9:39 PM     HISTORY:  low BP       COMPARISON:  Chest x-ray 6/3/2022, same day abdomen and pelvis CT    FINDINGS: AP radiograph of the chest. The cardiomediastinal silhouette  is within normal limits. Atherosclerotic calcifications of the aortic  arch. No pleural effusion or pneumothorax. No focal airspace opacity.  The visualized upper abdomen is unremarkable. No acute osseous  abnormality.      Impression    IMPRESSION: No focal airspace opacity.    I have personally reviewed the examination and initial interpretation  and I agree with the findings.    SAUL CAREY MD          SYSTEM ID:  [id number]   XR Chest Port 1 View    Narrative    EXAMINATION:  XR CHEST PORT 1 VIEW 10/6/2022 11:02 AM.    COMPARISON: 10/4/2022 chest radiograph.    HISTORY:  cough    Additional HISTORY: Destiny Gomez is a 86 year old female with a PMHx  significant for?HTN, dementia, breast cancer s/p bilateral mastectomy  and tamoxifen treatment who is presenting from her memory care  facility for worsening confusion and weakness associated with low  blood pressures.    FINDINGS: AP chest radiograph at 45 degree angle. Trachea is midline.  Cardiomediastinal silhouette within normal limits. Mild aortic  calcification. Mildly indistinct pulmonary vasculature. Coarse  reticular pattern of bilateral lung fields. Left basilar airspace  opacities obscuring left hemidiaphragm. Mild asymmetric left  hemidiaphragm elevation.      Impression    IMPRESSION:   1. No focal consolidative airspace disease.   2. Left lower lobe airspace opacity, which may be atelectasis and/or  infection.    I have personally reviewed the examination and initial interpretation  and I agree with the findings.    MOSES CARRANZA MD         SYSTEM ID:  [id number]   US Renal Complete    Narrative    US RENAL COMPLETE 10/7/2022 10:55 AM      COMPARISON: None.    HISTORY: evaluate for resolution of hydronephrosis    FINDINGS:      The right and left kidneys measure 10.7 cm and 10.5 cm, respectively.  Cortical echogenicity is normal. There are some areas of cortical  thinning consistent with scarring present on CT noted, right greater  than left. No solid renal mass or hydronephrosis. Simple cyst arising  from the inferior pole of the right kidney measuring up to 2.5 cm.  Large right renal stone with shadowing, similar to CT dated 6/10/2022.  Previously seen left renal stone is not well-visualized on this exam,  possibly correlating with echogenic areas on cine imaging through the  lower pole.    The bladder is partially filled and appears unremarkable.       Impression    IMPRESSION:    1. No hydronephrosis.  2. Large right shadowing renal stone similar to CT dated 6/10/2022.  Previously seen left renal stone is not well-visualized on this exam,  possibly noted on cine imaging.    I have personally reviewed the examination and initial interpretation  and I agree with the findings.    WENDY KIRBY MD         SYSTEM ID:  [id number]       Discharge Medications   Current Discharge Medication List      START taking these medications    Details   amoxicillin-clavulanate (AUGMENTIN) 875-125 MG tablet Take 1 tablet by mouth every 12 hours for 9 days  Qty: 17 tablet, Refills: 0    Associated Diagnoses: Pyelonephritis, acute         CONTINUE these medications which have NOT CHANGED    Details   acetaminophen (TYLENOL) 500 MG tablet Take 500-1,000 mg by mouth every 6 hours as needed for mild pain      FLUoxetine (PROZAC) 20 MG capsule Take 1 capsule (20 mg) by mouth daily  Qty: 90 capsule, Refills: 3    Associated Diagnoses: AVA (generalized anxiety disorder)      fluticasone (FLONASE) 50 MCG/ACT nasal spray Spray 2 sprays into both nostrils daily      losartan (COZAAR) 100 MG tablet TAKE 1 TABLET BY MOUTH ONCE DAILY  Qty: 90 tablet, Refills: 2    Comments: CYCLE FILL REQUEST FOR CYCLE THAT STARTS 09/29/2022  Associated Diagnoses: Essential hypertension      QUEtiapine (SEROQUEL) 25 MG tablet TAKE 0.5 TABLETS (12.5 MG) BY MOUTH 3 TIMES DAILY AS NEEDED (ANXIETY/AGITATION)  Qty: 90 tablet, Refills: 1    Associated Diagnoses: AVA (generalized anxiety disorder)      estradiol (ESTRACE VAGINAL) 0.1 MG/GM vaginal cream Apply small amount to the vaginal opening and urethra M, W, F @ h.s.  Qty: 42.5 g, Refills: 3    Associated Diagnoses: Urinary frequency           Allergies   No Known Allergies

## 2022-10-11 NOTE — PLAN OF CARE
Physical Therapy Discharge Summary    Reason for therapy discharge:    Discharged to home with home therapy. (ROVERTO)    Progress towards therapy goal(s). See goals on Care Plan in [facility] electronic health record for goal details.  Goals partially met.  Barriers to achieving goals:   discharge from facility.    Therapy recommendation(s):    Continued therapy is recommended.  Rationale/Recommendations:   PT for continued progression of strength, mobility, and endurance.

## 2022-10-12 NOTE — PROGRESS NOTES
"[facility] GERIATRICS    PRIMARY CARE PROVIDER AND CLINIC:  Ramez Colon, APRN CNP, [address] [id number]  Chief Complaint   Patient presents with     [facility] Medical Record Number:  [id number]  Place of Service where encounter took place:  [facility]) [[id number]]    Destiny Gomez  is a 86 year old  (1935), admitted to the above facility 8/29/2022 from a different AL. She was hospitalized at [facility]  10/4/22 through 10/11/22. We were asked to assume primary care upon her return to the facility 10/11/2022.     HPI:    She has a medical history significant for dementia, anxiety, HTN, CKD stage 3, breast cancer 2002, sinus infections,  and was hospitalized after presenting to the ED 10/4/2022 with weakness, frequent falls and increased confusion. She was septic with SBP in the 80s. Ceftriaxone was started for abnormal UA. WBC 13.9, ,  creatinine 1.22, Na 131. CT head negative for acute pathology. 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  A soft tissue wound at the sacrococcygeal region with adjacent inflammatory changes was noted. She was treated with Augmentin. Urology consulted and recommended outpatient follow up for management of the stones. Collins catheter was placed for urinary retention. [facility] nurse consulted for sacral ulcer and recommended mepilex dressing.     She is a poor historian. Speaks a few words, repeats \"[place], [place]\" multiple times. Does not respond to questions appropriately. Staff reports her oral intake has been very poor and overall status has declined since hospitalization. She declined all meds this morning. Urine output was scant overnight. Requires max assist of 1-2 with cares.   Daughter Caryn Osborne reports that " "patient \"gave up\" after her 's death 4 yrs ago. She has become more withdrawn since the acute illness. Long standing anxiety and used to have verbal outbursts. Has been followed by Dr Urban of Urology since early this year.     CODE STATUS/ADVANCE DIRECTIVES DISCUSSION:  No CPR- Do NOT Intubate  DNR / DNI  ALLERGIES: No Known Allergies   PAST MEDICAL HISTORY:   Past Medical History:   Diagnosis Date     Benign essential hypertension      Dementia (H)      History of basal cell carcinoma (BCC) 2010    left lower eyelid     History of breast cancer 2002    bilateral breast CA s/p bilateral radical mastectomy and Tamoxifen     Mumps      Spider veins       PAST SURGICAL HISTORY:   has a past surgical history that includes Mastectomy modified radical (Bilateral, 2002) and knee surgery (Left, 1960s).  FAMILY HISTORY: family history includes Alzheimer Disease in her mother; Diabetes Type 2  in her mother; Kidney Disease in her sister.  SOCIAL HISTORY:   reports that she quit smoking about 64 years ago. She has a 0.75 pack-year smoking history. She has never used smokeless tobacco. She reports that she does not currently use alcohol. She reports that she does not use drugs.  Patient's living condition: lived at [facility].  4 yrs ago. Daughter Caryn Osborne is primary contact. Son Gaetano Gomez is also in the [facility] area.     Post Discharge Medication Reconciliation Status:   [id number]}  Post Medication Reconciliation Status: Discharge medications reconciled, continue medications without change         Current Outpatient Medications   Medication Sig     acetaminophen (TYLENOL) 500 MG tablet Take 500-1,000 mg by mouth every 6 hours as needed for mild pain     estradiol (ESTRACE VAGINAL) 0.1 MG/GM vaginal cream Apply small amount to the vaginal opening and urethra M, W, F @ h.s.     FLUoxetine (PROZAC) 20 MG capsule Take 1 capsule (20 mg) by mouth daily     fluticasone (FLONASE) 50 MCG/ACT nasal spray Spray 2 sprays " "into both nostrils daily     QUEtiapine (SEROQUEL) 25 MG tablet TAKE 0.5 TABLETS (12.5 MG) BY MOUTH 3 TIMES DAILY AS NEEDED (ANXIETY/AGITATION)     losartan (COZAAR) 100 MG tablet Take 0.5 tablets (50 mg) by mouth daily     No current facility-administered medications for this visit.     Facility-Administered Medications Ordered in Other Visits   Medication     iopamidol (ISOVUE-370) solution 100 mL       ROS:  Unable to obtain due to dementia. Positives as noted under HPI    Vitals:  /74   Pulse 87   Temp 97.9  F (36.6  C)   Resp 20   Ht 1.651 m (5' 5\")   Wt 66.7 kg (147 lb)   SpO2 92%   BMI 24.46 kg/m    Exam:  GENERAL APPEARANCE:  sleepy, pale, appears frail  ENT:  [place], oral mucosa dry  EYES:  conjunctiva and lids normal  RESP:  diminished breath sounds bilaterally, no crackles or wheezes  CV:  regular rate and rhythm, no murmur, no edema  ABDOMEN:  soft, non-tender, no distension, no masses  Rose with concentrated urine   M/S:   in bed. AGUILERA with generalized weakness. No joint inflammation  SKIN:  coccyx wound not examined. No rashes  PSYCH:  oriented to self, insight and judgement impaired, memory impaired , flat affect    Lab/Diagnostic data:  Recent labs in [facility] reviewed by me today.     ASSESSMENT / PLAN:  (N20.0) Calculus of kidney  (primary encounter diagnosis)  (N12) Pyelonephritis  (R33.9) Urinary retention  (Z46.6) Urinary catheter (Rose) change required  Comment: infection appears resolved, but overall status has declined since returning to the facility.   Plan: routine rose catheter cares. Refer to hospice-see below.     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 Appears calm and comfortable. Very poor oral intake. Dependent for all cares.   Plan: continue fluoxetine, prn seroquel. Memory Care staff assistance with cares, meals, activities, med admin     (I10) Primary " hypertension  Comment: controlled with BPs: 132/68, 124/74  HR: 72-87  Plan: continue losartan. Monitor VS and adjust dose as indicated     (Z85.3) Personal history of malignant neoplasm of breast  Comment: bilateral mastectomy 2002 and tamoxifen.   Plan: monitor     Pressure ulcer  Comment: not examined today. No signs of infection, per staff  Plan: continue wound care with foam dressing, frequent repositioning. Unlikely to heal given her poor nutritional status.     (Z71.89) Advanced directives, counseling/discussion  Comment: discussed with daughter, who has a good understanding of the situation and wants to transition to comfort care with no further hospitalizations   Plan: refer to [facility]         Total time spent with patient visit at the AL facility was 60 mins  including patient visit, review of past records and phone call with daughter Caryn Osborne . Greater than 50% of total time spent with counseling and coordinating care due to establishing primary care. Counseling daughter re: hospitalization and treatment, progressive dementia, medications and potential side effects, plan of care, goals of care including hospice referral. Coordinating the following with facility staff: medication orders, plan of care, hospice referral.         Electronically signed by:  ANALI Christine CNP

## 2022-10-13 NOTE — LETTER
10/13/2022        RE: Destiny Gomez  [address]    [address]  [facility] [address]        [facility] GERIATRICS    PRIMARY CARE PROVIDER AND CLINIC:  ANALI Christine [facility], [address] [facility] [id number]***  No chief complaint on file.     [facility] Record Number:  [id number]  Place of Service where encounter took place:  No question data found.    Destiny Gomez  is a 86 year old  (1935), {fgsinitialoption:115606}.   HPI:    ***    CODE STATUS/ADVANCE DIRECTIVES DISCUSSION:  No CPR- Do NOT Intubate  {CODE STATUS:759916}  ALLERGIES: No Known Allergies   PAST MEDICAL HISTORY:   Past Medical History:   Diagnosis Date     Benign essential hypertension      Dementia (H)      History of basal cell carcinoma (BCC) 2010    left lower eyelid     History of breast cancer 2002    bilateral breast CA s/p bilateral radical mastectomy and Tamoxifen     Mumps      Spider veins       PAST SURGICAL HISTORY:   has a past surgical history that includes Mastectomy modified radical (Bilateral, 2002) and knee surgery (Left, 1960s).  FAMILY HISTORY: family history includes Alzheimer Disease in her mother; Diabetes Type 2  in her mother; Kidney Disease in her sister.  SOCIAL HISTORY:   reports that she quit smoking about 64 years ago. She has a 0.75 pack-year smoking history. She has never used smokeless tobacco. She reports that she does not currently use alcohol. She reports that she does not use drugs.  Patient's living condition: {LIVES WITH (NURSING HOME):915992}    Post Discharge Medication Reconciliation Status:   {TIP  Click the link below to document, then refresh to pull in response :854619}  Post Medication Reconciliation Status:           Current Outpatient Medications   Medication Sig     acetaminophen (TYLENOL) 500 MG tablet Take 500-1,000 mg by mouth every 6 hours as needed for mild pain     amoxicillin-clavulanate (AUGMENTIN) 875-125 MG tablet Take 1 tablet by mouth every  12 hours for 9 days     estradiol (ESTRACE VAGINAL) 0.1 MG/GM vaginal cream Apply small amount to the vaginal opening and urethra M, W, F @ h.s.     FLUoxetine (PROZAC) 20 MG capsule Take 1 capsule (20 mg) by mouth daily     fluticasone (FLONASE) 50 MCG/ACT nasal spray Spray 2 sprays into both nostrils daily     losartan (COZAAR) 100 MG tablet TAKE 1 TABLET BY MOUTH ONCE DAILY     QUEtiapine (SEROQUEL) 25 MG tablet TAKE 0.5 TABLETS (12.5 MG) BY MOUTH 3 TIMES DAILY AS NEEDED (ANXIETY/AGITATION)     No current facility-administered medications for this visit.     Facility-Administered Medications Ordered in Other Visits   Medication     iopamidol (ISOVUE-370) solution 100 mL       ROS:  {ROS FGS:212877}    Vitals:  There were no vitals taken for this visit.  Exam:  {[facility] physical exam :867128}    Lab/Diagnostic data:  {fgslab:255617}    ASSESSMENT/PLAN:    {FGS DX2:039654}    Orders:  {fgsorders:126773}  ***    Total time spent with patient visit at the [facility] nursing [facility] was {1/2/3/4/5:255093} including {1/2/3/4/5:884990}. Greater than 50% of total time spent with counseling and coordinating care due to ***.     Electronically signed by:  Corrina Fajardo MA ***                [facility] GERIATRICS    PRIMARY CARE PROVIDER AND CLINIC:  ANALI Christine [facility], [address] [facility] / [facility] [address]***  Chief Complaint   Patient presents with     [facility] Medical Record Number:  [id number]  Place of Service where encounter took place:  [facility]) [[id number]]    Destiny Gomez  is a 86 year old  (1935), admitted to the above facility from  [facility]. Hospital stay 10/4/22 through 10/11/22..   HPI:    ***    CODE STATUS/ADVANCE DIRECTIVES DISCUSSION:  No CPR- Do NOT Intubate  DNR / DNI  ALLERGIES: No Known Allergies   PAST MEDICAL HISTORY:   Past Medical History:   Diagnosis Date     Benign  essential hypertension      Dementia (H)      History of basal cell carcinoma (BCC) 2010    left lower eyelid     History of breast cancer 2002    bilateral breast CA s/p bilateral radical mastectomy and Tamoxifen     Mumps      Spider veins       PAST SURGICAL HISTORY:   has a past surgical history that includes Mastectomy modified radical (Bilateral, 2002) and knee surgery (Left, 1960s).  FAMILY HISTORY: family history includes Alzheimer Disease in her mother; Diabetes Type 2  in her mother; Kidney Disease in her sister.  SOCIAL HISTORY:   reports that she quit smoking about 64 years ago. She has a 0.75 pack-year smoking history. She has never used smokeless tobacco. She reports that she does not currently use alcohol. She reports that she does not use drugs.  Patient's living condition: lives alone    Post Discharge Medication Reconciliation Status:   {TIP  Click the link below to document, then refresh to pull in response :456084}  Post Medication Reconciliation Status:           Current Outpatient Medications   Medication Sig     acetaminophen (TYLENOL) 500 MG tablet Take 500-1,000 mg by mouth every 6 hours as needed for mild pain     amoxicillin-clavulanate (AUGMENTIN) 875-125 MG tablet Take 1 tablet by mouth every 12 hours for 9 days     estradiol (ESTRACE VAGINAL) 0.1 MG/GM vaginal cream Apply small amount to the vaginal opening and urethra M, W, F @ h.s.     FLUoxetine (PROZAC) 20 MG capsule Take 1 capsule (20 mg) by mouth daily     fluticasone (FLONASE) 50 MCG/ACT nasal spray Spray 2 sprays into both nostrils daily     losartan (COZAAR) 100 MG tablet TAKE 1 TABLET BY MOUTH ONCE DAILY     QUEtiapine (SEROQUEL) 25 MG tablet TAKE 0.5 TABLETS (12.5 MG) BY MOUTH 3 TIMES DAILY AS NEEDED (ANXIETY/AGITATION)     No current facility-administered medications for this visit.     Facility-Administered Medications Ordered in Other Visits   Medication     iopamidol (ISOVUE-370) solution 100 mL       ROS:  10 point ROS  "of systems including Constitutional, Eyes, Respiratory, Cardiovascular, Gastroenterology, Genitourinary, Integumentary, Musculoskeletal, Psychiatric were all negative except for pertinent positives noted in my HPI.    Vitals:  /74   Pulse 87   Temp 97.9  F (36.6  C)   Resp 20   Ht 1.651 m (5' 5\")   Wt 66.7 kg (147 lb)   SpO2 92%   BMI 24.46 kg/m    Exam:  GENERAL APPEARANCE:  {[facility] GENERAL APPEARANCE:796917}  ENT:  {[facility] ENT PE:618232}  EYES:  {[facility] EYES PE :533845}  RESP:  {[facility] HOME RESP PE:455078}  CV:  {[facility] CV PE:497917}  ABDOMEN:  {[facility] HOME GI PE:334797}  M/S:   {[facility] HOME M/S PE:019187}  SKIN:  {NURSING HOME SKIN PE:767585}  NEURO:   {[facility] NEURO PE:630998}  PSYCH:  {[facility] PSYCH PE:811559}    Lab/Diagnostic data:  {fgslab:196518}    ASSESSMENT/PLAN:    {FGS DX2:585336}    Orders:  {fgsorders:694527}  ***    Total time spent with patient visit at the skilled nursing facility was {1/2/3/4/5:021154} including {1/2/3/4/5:502374}. Greater than 50% of total time spent with counseling and coordinating care due to ***.     Electronically signed by:  Corrina Fajardo MA ***                    Sincerely,        ANALI Christine CNP    "

## 2022-10-14 NOTE — TELEPHONE ENCOUNTER
[facility] Call Center    Phone Message    May a detailed message be left on voicemail: yes     Reason for Call: Appointment Intake    Referring Provider Name: Stephan Kingston MD  Diagnosis and/or Symptoms: Kidney infection due to kidney stones    Patient is being referred for the above. Appt needed in 1-2 weeks. Writer unable to schedule within requested timeframe. Sending encounter per guidelines. Please review and follow-up with patient for scheduling.       Action Taken: Message routed to:  [facility] Urology    Travel Screening: Not Applicable

## 2022-10-17 NOTE — TELEPHONE ENCOUNTER
Message left for fer Rubin to call back to discuss scheduling a video visit tomorrow 10/18/22 at 12:00 with Dr. Solomon.    Ariadne Atkins, [facility]  10/17/22  9:17 AM

## 2022-10-17 NOTE — TELEPHONE ENCOUNTER
Per daughter, NP at living facility did place referral for hospice, they are not requesting any appointments at this time, request closed    Huma, RN  Care Coordinator  [phone]

## 2022-10-20 NOTE — LETTER
"    10/20/2022        RE: Destiny Gomez  [address]    [address]  [facility] [address]        [facility] GERIATRICS    Chief Complaint   Patient presents with     RECHECK     HPI:  Destiny Gomez is a 86 year old  (1935), who is being seen today for an episodic care visit at: [facility]) [[id number]].   She admitted to Memory Care at this facility 8/29/2022. We assumed primary care 10/11/2022.   Medical history significant for dementia, anxiety, HTN, CKD stage 3, breast cancer 2002, sinus infections. She was hospitalized at [facility] 10/4-10/22/2022 with sepsis due to pyelonephritis with staghorn calculi.   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  A soft tissue wound at the sacrococcygeal region with adjacent inflammatory changes was noted. She was treated with ceftriaxone and Augmentin. Urology consulted and recommended outpatient follow up for management of the stones. Collins catheter was placed for urinary retention.    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 Alert and interactive. Denies pain. Staff reports her oral intake remains poor, but she's taking fluids with encouragement and had a few bites at breakfast. Urine output was improved at 500 ml overnight. Has had gross hematuria for the past few days. Bedbound and requires max assist with cares.       Allergies, and PMH/PSH reviewed in EPIC today    REVIEW OF SYSTEMS:  Unable to obtain due to dementia. Positives as noted under HPI     Objective:   /63   Pulse 76   Temp 98.1  F (36.7  C)   Resp 16   Ht 1.651 m (5' 5\")   Wt 66.7 kg (147 lb)   SpO2 92%   BMI 24.46 kg/m    GENERAL " APPEARANCE:  alert, in no distress, frail appearing   ENT:  [place], oral mucosa dry  EYES:  conjunctiva and lids normal  RESP:  diminished breath sounds bilaterally, no crackles or wheezes  CV:  regular rate and rhythm, no murmur, no edema  ABDOMEN:  soft, non-tender, no distension, no masses  Rose with small amount of urine output, gross hematuria  M/S:   in bed. AGUILERA with generalized weakness. No joint inflammation  SKIN:  coccyx wound not examined. No rashes  PSYCH:  oriented to self, insight and judgement impaired, memory impaired , affect normal     Recent labs in EPIC reviewed by me today.     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 Oral intake remains very poor. She's more alert today  Plan: continue fluoxetine, prn seroquel. Memory Care staff assistance with cares, meals, activities, med admin. Family is meeting with [facility] later today.     (I10) Essential hypertension  Comment: recent BPs soft 115/63, 124/74, 102/55  HR 72-98  Plan: decrease losartan to 50 mg daily. Monitor VS and adjust dose as indicated    (N12) Pyelonephritis  (R31.0) Gross hematuria  (N20.0) Calculus of kidney  (R33.9) Urinary retention  (Z97.8) Rose catheter in place  Comment: no signs of pain related to the stones. Rose patent with hematuria. Afebrile   Plan: routine rose catheter cares. Encourage fluids.         Electronically signed by: ANALI Christine CNP           Sincerely,        ANALI Christine CNP

## 2022-10-20 NOTE — PROGRESS NOTES
"[facility] GERIATRICS    Chief Complaint   Patient presents with     RECHECK     HPI:  Destiny Gomez is a 86 year old  (1935), who is being seen today for an episodic care visit at: [facility]) [[id number]].   She admitted to Memory Care at this facility 8/29/2022. We assumed primary care 10/11/2022.   Medical history significant for dementia, anxiety, HTN, CKD stage 3, breast cancer 2002, sinus infections. She was hospitalized at [facility] 10/4-10/22/2022 with sepsis due to pyelonephritis with staghorn calculi.   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  A soft tissue wound at the sacrococcygeal region with adjacent inflammatory changes was noted. She was treated with ceftriaxone and Augmentin. Urology consulted and recommended outpatient follow up for management of the stones. Collins catheter was placed for urinary retention.    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 Alert and interactive. Denies pain. Staff reports her oral intake remains poor, but she's taking fluids with encouragement and had a few bites at breakfast. Urine output was improved at 500 ml overnight. Has had gross hematuria for the past few days. Bedbound and requires max assist with cares.       Allergies, and PMH/PSH reviewed in EPIC today    REVIEW OF SYSTEMS:  Unable to obtain due to dementia. Positives as noted under HPI     Objective:   /63   Pulse 76   Temp 98.1  F (36.7  C)   Resp 16   Ht 1.651 m (5' 5\")   Wt 66.7 kg (147 lb)   SpO2 92%   BMI 24.46 kg/m    GENERAL APPEARANCE:  alert, in no distress, frail appearing   ENT:  [place], oral mucosa dry  EYES:  conjunctiva and lids " normal  RESP:  diminished breath sounds bilaterally, no crackles or wheezes  CV:  regular rate and rhythm, no murmur, no edema  ABDOMEN:  soft, non-tender, no distension, no masses  Rose with small amount of urine output, gross hematuria  M/S:   in bed. AGUILERA with generalized weakness. No joint inflammation  SKIN:  coccyx wound not examined. No rashes  PSYCH:  oriented to self, insight and judgement impaired, memory impaired , affect normal     Recent labs in [facility] reviewed by me today.     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 Oral intake remains very poor. She's more alert today  Plan: continue fluoxetine, prn seroquel. Memory Care staff assistance with cares, meals, activities, med admin. Family is meeting with [facility] later today.     (I10) Essential hypertension  Comment: recent BPs soft 115/63, 124/74, 102/55  HR 72-98  Plan: decrease losartan to 50 mg daily. Monitor VS and adjust dose as indicated    (N12) Pyelonephritis  (R31.0) Gross hematuria  (N20.0) Calculus of kidney  (R33.9) Urinary retention  (Z97.8) Rose catheter in place  Comment: no signs of pain related to the stones. Rose patent with hematuria. Afebrile   Plan: routine rose catheter cares. Encourage fluids.         Electronically signed by: ANALI Chritsine CNP

## 2022-11-02 NOTE — PROGRESS NOTES
[facility] GERIATRICS    Chief Complaint   Patient presents with     RECHECK     HPI:  Destiny Gomez is a 87 year old  (1935), who is being seen today for an episodic care visit at: [facility]) [[id number]].   She admitted to Memory Care at this facility 8/29/2022. We assumed primary care 10/11/2022.   Medical history significant for dementia, anxiety, HTN, CKD stage 3, breast cancer 2002, sinus infections. She was hospitalized at [facility] 10/4-10/22/2022 with sepsis due to pyelonephritis with staghorn calculi. 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  A soft tissue wound at the sacrococcygeal region with adjacent inflammatory changes was noted. She was treated with ceftriaxone and Augmentin. Urology consulted and recommended outpatient follow up for management of the stones. Collins catheter was placed for urinary retention.  Her status continued to decline with poor oral intake and she enrolled with [facility] 10/20/2022.     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 She was unable to take meds or oral intake this am.   She's sleepy, but opens her eyes and smiles. Says a few words in a soft voice, then falls back to sleep.     Allergies, and PMH/PSH reviewed in EPIC today    REVIEW OF SYSTEMS:  Unable to obtain due to dementia, end of life     Objective:   BP (!) 122/92   Pulse (!) 121   Temp 98  F (36.7  C)   Resp 18   Wt 66.7 kg (147 lb)   BMI 24.46 kg/m    GENERAL APPEARANCE:  sleepy, appears comfortable   ENT:  [place], oral mucosa dry  RESP:  diminished breath sounds bilaterally with few scattered  crackles   CV:  regular rate and rhythm, no murmur, no edema. Feet cool to touch   ABDOMEN:  soft, nondistended.   Collins with small amount of dark urine   M/S:   in bed. No joint inflammation  SKIN:  coccyx wound not examined. 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 Appears very comfortable   Plan: discontinue all meds except comfort meds per [facility]. Memory Care staff assistance with cares and med admin.   Discussed with her daughter Caryn Osborne who was here early this morning and will be coming this evening. Family members have visited and said their good byes. Daughter appears to be coping well.   Discussed with hospice nurse and facility staff.       Electronically signed by: ANALI Christine CNP

## 2022-11-02 NOTE — LETTER
11/2/2022        RE: Destiny Gomez  [address]    [address]  [facility] [address]        [facility] GERIATRICS    Chief Complaint   Patient presents with     RECHECK     HPI:  Destiny Gomez is a 87 year old  (1935), who is being seen today for an episodic care visit at: [facility]) [[id number]].   She admitted to Memory Care at this facility 8/29/2022. We assumed primary care 10/11/2022.   Medical history significant for dementia, anxiety, HTN, CKD stage 3, breast cancer 2002, sinus infections. She was hospitalized at [facility] 10/4-10/22/2022 with sepsis due to pyelonephritis with staghorn calculi. 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  A soft tissue wound at the sacrococcygeal region with adjacent inflammatory changes was noted. She was treated with ceftriaxone and Augmentin. Urology consulted and recommended outpatient follow up for management of the stones. Collins catheter was placed for urinary retention.  Her status continued to decline with poor oral intake and she enrolled with [facility] 10/20/2022.     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 She was unable to take meds or oral intake this am.   She's sleepy, but opens her eyes and smiles. Says a few words in a soft voice, then falls back to sleep.     Allergies, and PMH/PSH reviewed in EPIC today    REVIEW OF SYSTEMS:  Unable to obtain due to dementia, end of life     Objective:   BP (!) 122/92   Pulse (!) 121   Temp 98  F (36.7  C)   Resp 18   Wt 66.7 kg (147 lb)   BMI 24.46 kg/m    GENERAL APPEARANCE:  sleepy, appears  comfortable   ENT:  [place], oral mucosa dry  RESP:  diminished breath sounds bilaterally with few scattered crackles   CV:  regular rate and rhythm, no murmur, no edema. Feet cool to touch   ABDOMEN:  soft, nondistended.   Collins with small amount of dark urine   M/S:   in bed. No joint inflammation  SKIN:  coccyx wound not examined. 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 Appears very comfortable   Plan: discontinue all meds except comfort meds per [facility]. Memory Care staff assistance with cares and med admin.   Discussed with her daughter Caryn Osborne who was here early this morning and will be coming this evening. Family members have visited and said their good byes. Daughter appears to be coping well.   Discussed with hospice nurse and facility staff.       Electronically signed by: ANALI Christine CNP             Sincerely,        ANALI Christine CNP